# Patient Record
Sex: MALE | Race: BLACK OR AFRICAN AMERICAN | NOT HISPANIC OR LATINO | Employment: FULL TIME | URBAN - METROPOLITAN AREA
[De-identification: names, ages, dates, MRNs, and addresses within clinical notes are randomized per-mention and may not be internally consistent; named-entity substitution may affect disease eponyms.]

---

## 2017-04-10 ENCOUNTER — HOSPITAL ENCOUNTER (EMERGENCY)
Facility: HOSPITAL | Age: 72
Discharge: HOME | End: 2017-04-10
Attending: EMERGENCY MEDICINE
Payer: COMMERCIAL

## 2017-04-10 VITALS
OXYGEN SATURATION: 100 % | TEMPERATURE: 98 F | BODY MASS INDEX: 28.1 KG/M2 | RESPIRATION RATE: 18 BRPM | HEART RATE: 71 BPM | SYSTOLIC BLOOD PRESSURE: 167 MMHG | WEIGHT: 212 LBS | DIASTOLIC BLOOD PRESSURE: 75 MMHG | HEIGHT: 73 IN

## 2017-04-10 DIAGNOSIS — S61.219A FINGER LACERATION WITH COMPLICATION, INITIAL ENCOUNTER: ICD-10-CM

## 2017-04-10 DIAGNOSIS — S62.663A CLOSED NONDISPLACED FRACTURE OF DISTAL PHALANX OF LEFT MIDDLE FINGER, INITIAL ENCOUNTER: Primary | ICD-10-CM

## 2017-04-10 DIAGNOSIS — S61.309A TRAUMATIC AVULSION OF NAIL PLATE OF FINGER, INITIAL ENCOUNTER: ICD-10-CM

## 2017-04-10 DIAGNOSIS — S62.665A CLOSED NONDISPLACED FRACTURE OF DISTAL PHALANX OF LEFT RING FINGER, INITIAL ENCOUNTER: ICD-10-CM

## 2017-04-10 PROCEDURE — 90715 TDAP VACCINE 7 YRS/> IM: CPT | Performed by: EMERGENCY MEDICINE

## 2017-04-10 PROCEDURE — 90471 IMMUNIZATION ADMIN: CPT | Performed by: EMERGENCY MEDICINE

## 2017-04-10 PROCEDURE — 25000003 PHARM REV CODE 250: Performed by: EMERGENCY MEDICINE

## 2017-04-10 PROCEDURE — 99284 EMERGENCY DEPT VISIT MOD MDM: CPT | Mod: 25

## 2017-04-10 PROCEDURE — 29130 APPL FINGER SPLINT STATIC: CPT | Mod: LT

## 2017-04-10 PROCEDURE — 29131 APPL FINGER SPLINT DYNAMIC: CPT

## 2017-04-10 PROCEDURE — 63600175 PHARM REV CODE 636 W HCPCS: Performed by: EMERGENCY MEDICINE

## 2017-04-10 PROCEDURE — 12002 RPR S/N/AX/GEN/TRNK2.6-7.5CM: CPT

## 2017-04-10 PROCEDURE — 96372 THER/PROPH/DIAG INJ SC/IM: CPT

## 2017-04-10 RX ORDER — LIDOCAINE HYDROCHLORIDE 10 MG/ML
10 INJECTION, SOLUTION EPIDURAL; INFILTRATION; INTRACAUDAL; PERINEURAL
Status: COMPLETED | OUTPATIENT
Start: 2017-04-10 | End: 2017-04-10

## 2017-04-10 RX ORDER — SULFAMETHOXAZOLE AND TRIMETHOPRIM 800; 160 MG/1; MG/1
1 TABLET ORAL 2 TIMES DAILY
Qty: 10 TABLET | Refills: 0 | Status: SHIPPED | OUTPATIENT
Start: 2017-04-10 | End: 2017-04-15

## 2017-04-10 RX ORDER — MELOXICAM 15 MG/1
15 TABLET ORAL DAILY
Qty: 5 TABLET | Refills: 5 | OUTPATIENT
Start: 2017-04-10 | End: 2023-02-16

## 2017-04-10 RX ORDER — CEFAZOLIN SODIUM 1 G/3ML
1 INJECTION, POWDER, FOR SOLUTION INTRAMUSCULAR; INTRAVENOUS
Status: COMPLETED | OUTPATIENT
Start: 2017-04-10 | End: 2017-04-10

## 2017-04-10 RX ORDER — HYDROCODONE BITARTRATE AND ACETAMINOPHEN 10; 325 MG/1; MG/1
1 TABLET ORAL EVERY 4 HOURS PRN
Qty: 18 TABLET | Refills: 0 | Status: SHIPPED | OUTPATIENT
Start: 2017-04-10 | End: 2018-11-20

## 2017-04-10 RX ORDER — HYDROCODONE BITARTRATE AND ACETAMINOPHEN 10; 325 MG/1; MG/1
1 TABLET ORAL
Status: COMPLETED | OUTPATIENT
Start: 2017-04-10 | End: 2017-04-10

## 2017-04-10 RX ORDER — MELOXICAM 15 MG/1
7.5 TABLET ORAL DAILY
COMMUNITY
End: 2017-04-10

## 2017-04-10 RX ADMIN — CEFAZOLIN 1 G: 1 INJECTION, POWDER, FOR SOLUTION INTRAMUSCULAR; INTRAVENOUS at 11:04

## 2017-04-10 RX ADMIN — CLOSTRIDIUM TETANI TOXOID ANTIGEN (FORMALDEHYDE INACTIVATED), CORYNEBACTERIUM DIPHTHERIAE TOXOID ANTIGEN (FORMALDEHYDE INACTIVATED), BORDETELLA PERTUSSIS TOXOID ANTIGEN (GLUTARALDEHYDE INACTIVATED), BORDETELLA PERTUSSIS FILAMENTOUS HEMAGGLUTININ ANTIGEN (FORMALDEHYDE INACTIVATED), BORDETELLA PERTUSSIS PERTACTIN ANTIGEN, AND BORDETELLA PERTUSSIS FIMBRIAE 2/3 ANTIGEN 0.5 ML: 5; 2; 2.5; 5; 3; 5 INJECTION, SUSPENSION INTRAMUSCULAR at 11:04

## 2017-04-10 RX ADMIN — LIDOCAINE HYDROCHLORIDE 100 MG: 10 INJECTION, SOLUTION EPIDURAL; INFILTRATION; INTRACAUDAL; PERINEURAL at 12:04

## 2017-04-10 RX ADMIN — HYDROCODONE BITARTRATE AND ACETAMINOPHEN 1 TABLET: 10; 325 TABLET ORAL at 12:04

## 2017-04-10 NOTE — ED PROVIDER NOTES
Encounter Date: 4/10/2017       History     Chief Complaint   Patient presents with    Hand Injury     left middle finger smashed in roller of saw     Review of patient's allergies indicates:  No Known Allergies  Patient is a 72 y.o. male presenting with the following complaint: skin laceration. The history is provided by the patient.   Laceration    The incident occurred just prior to arrival. The laceration is located on the left hand. The laceration is 5 cm in size. The laceration mechanism was a a blunt object. The pain is at a severity of 9/10. The pain has been fluctuating since onset. He reports no foreign bodies present. His tetanus status is out of date.   Patient reports he was at work and was going to use a saw when he got it smashed in the roller, denies any active bleeding, numbness, weakness or paresthesias.   Past Medical History:   Diagnosis Date    Myasthenia gravis      Past Surgical History:   Procedure Laterality Date    ROTATOR CUFF REPAIR Left      History reviewed. No pertinent family history.  Social History   Substance Use Topics    Smoking status: Former Smoker    Smokeless tobacco: None    Alcohol use Yes      Comment: occasional beer     Review of Systems   Constitutional: Negative for chills and fever.   Musculoskeletal: Negative for myalgias.   Skin: Positive for wound. Negative for color change and pallor.   Neurological: Negative for weakness and numbness.   Hematological: Does not bruise/bleed easily.       Physical Exam   Initial Vitals   BP Pulse Resp Temp SpO2   04/10/17 1021 04/10/17 1021 04/10/17 1021 04/10/17 1021 04/10/17 1021   214/95 72 18 98 °F (36.7 °C) 99 %     Vitals:    04/10/17 1021 04/10/17 1255   BP: (!) 214/95 (!) 167/75  Comment: md aware of current vitals an ok with dc of pt.   BP Location: Right arm    Patient Position: Sitting    Pulse: 72 71   Resp: 18 18   Temp: 98 °F (36.7 °C)    TempSrc: Oral    SpO2: 99% 100%   Weight: 96.2 kg (212 lb)    Height: 6'  "1" (1.854 m)        Physical Exam      Nursing Notes and Vital Signs Reviewed.  Constitutional:  Well developed, well nourished.  Awake & alert.  He is in no apparent distress  Head:  Atraumatic.  Normocephalic.    Eyes:  PERRL.  EOMI.  Conjunctivae are not pale. No scleral icterus.  ENT:  Mucous membranes are moist and intact.  Oropharynx is clear and symmetric.    Neck:  Supple.  No JVD.  No lymphadenopathy.  Cardiovascular:  Regular rate.  Regular rhythm.  No murmurs, rubs, or gallops.  Distal pulses are 2+ and symmetric.  Pulmonary/Chest:  No evidence of respiratory distress.  Clear to auscultation bilaterally.  No wheezing, rales or rhonchi.  Abdominal:  Soft and non-distended.  There is no tenderness.  No rebound, guarding, or rigidity.  No organomegaly.  Good bowel sounds.      Musculoskeletal:  Small subungual hematoma involving the left 4th digit nail bed, nail and nail bed intact, positive tenderness, no deformity, mild swelling, no bleeding or laceration or abrasion.  There is a complex skin avulsion about 4 cm of the left 3rd distal finger (see photo below), the nail plate under the avulsed skin appears lacerated small amount of blood noted. NVI. Patient is able to flex and extend at all joint spaces.   Skin:  Skin is warm and dry.      Neurological:  Alert, awake, and appropriate.  Normal speech.  No acute focal neurological deficits are appreciated.  Psychiatric:  Good eye contact.  Appropriate in content/context. Normal affect.                ED Course   Splint Application  Date/Time: 4/10/2017 5:37 PM  Performed by: SADI GARZA  Authorized by: SADI GARZA   Consent Done: Not Needed  Location details: left ring finger  Splint type: dynamic finger  Supplies used: aluminum splint  Post-procedure: The splinted body part was neurovascularly unchanged following the procedure.  Patient tolerance: Patient tolerated the procedure well with no immediate complications    Lac " Repair  Date/Time: 4/10/2017 5:34 PM  Performed by: SADI GARZA  Authorized by: SADI GARZA   Consent Done: Not Needed  Body area: upper extremity (left 3rd finger)  Laceration length: 3 cm  Tendon involvement: none  Nerve involvement: none  Vascular damage: no  Anesthesia: digital block    Anesthesia:  Anesthesia: digital block  Local Anesthetic: lidocaine 1% without epinephrine   Patient sedated: no  Preparation: Patient was prepped and draped in the usual sterile fashion.  Irrigation solution: saline (betadine)  Irrigation method: jet lavage  Amount of cleaning: standard  Debridement: none  Degree of undermining: none  Skin closure: 4-0 nylon  Number of sutures: 5  Technique: simple  Approximation: loose  Approximation difficulty: simple  Dressing: Steri-Strips and splint  Patient tolerance: Patient tolerated the procedure well with no immediate complications  Comments: There also appears to be small less than 0.5 cm laceration of the nail plate involving the left 3rd finger.     Splint Application  Date/Time: 4/10/2017 5:51 PM  Performed by: SADI GARAZ  Authorized by: SADI GARZA   Consent Done: Not Needed  Location details: left long finger  Splint type: dynamic finger  Supplies used: aluminum splint  Post-procedure: The splinted body part was neurovascularly unchanged following the procedure.  Patient tolerance: Patient tolerated the procedure well with no immediate complications        Labs Reviewed - No data to display                 Imaging Results         X-Ray Finger 2 or More Views (Final result) Result time:  04/10/17 11:05:35    Final result by Celso Carreon MD (04/10/17 11:05:35)    Impression:     Acute comminuted fractures involving the distal phalanges of the middle and ring fingers.    Arthritic changes favoring osteoarthritis.         Electronically signed by: CESLO CARREON MD  Date:     04/10/17  Time:    11:05     Narrative:    Left index, middle  and ring finger x-rays 3 views    Clinical history: Acute left finger injury.     Comparison: None    Findings: There are acute comminuted fractures involving the distal phalanges of the middle and ring fingers.    There is joint space narrowing involving several of the interphalangeal joints of the fingers as well as the 1st metacarpophalangeal joint.                          ED Course     11:50 AM Consult Orthopedics:  spoke with Dr. Medrano discussed x-ray findings and physical exam findings would like sutures placed 4-0 nylon to approximate skin, steri-strips, aluminum splint, bactrim and pain medication and to follow up in his clinic on Wednesday.     1:00 PM Reassessed the pt.  The pt is resting comfortably and is NAD.  Discussed test results, shared treatment plan, specific conditions for return, and the need for f/u.  Answered their questions at this time.  Pt understands and agrees to the plan.  The pt has remained hemodynamically stable through ED course and is stable for discharge.     Pre-hypertension/Hypertension: The pt has been informed that they may have pre-hypertension or hypertension based on a blood pressure reading in the ED. I recommend that the pt call the PCP listed on their discharge instructions or a physician of their choice this week to arrange f/u for further evaluation of possible pre-hypertension or hypertension.       Clinical Impression:     1. Closed nondisplaced fracture of distal phalanx of left middle finger, initial encounter    2. Closed nondisplaced fracture of distal phalanx of left ring finger, initial encounter    3. Traumatic avulsion of nail plate of finger, initial encounter    4. Finger laceration with complication, initial encounter          Disposition:   Disposition: Discharged  Condition: Stable       Sudha Kingsley MD  04/10/17 5624

## 2017-04-10 NOTE — ED NOTES
Splint applied to 3r and 4th digit L hand with non adhering gauze under splint, wrapped with gauze and coban tape. Pt tolerated well.

## 2018-11-20 ENCOUNTER — HOSPITAL ENCOUNTER (EMERGENCY)
Facility: HOSPITAL | Age: 73
Discharge: HOME | End: 2018-11-20
Attending: EMERGENCY MEDICINE
Payer: COMMERCIAL

## 2018-11-20 VITALS
SYSTOLIC BLOOD PRESSURE: 172 MMHG | RESPIRATION RATE: 18 BRPM | BODY MASS INDEX: 28.53 KG/M2 | OXYGEN SATURATION: 98 % | DIASTOLIC BLOOD PRESSURE: 95 MMHG | HEART RATE: 68 BPM | TEMPERATURE: 98 F | WEIGHT: 216.25 LBS

## 2018-11-20 DIAGNOSIS — S51.811A FOREARM LACERATION, RIGHT, INITIAL ENCOUNTER: ICD-10-CM

## 2018-11-20 PROCEDURE — 25000003 PHARM REV CODE 250: Performed by: EMERGENCY MEDICINE

## 2018-11-20 PROCEDURE — 99283 EMERGENCY DEPT VISIT LOW MDM: CPT | Mod: 25

## 2018-11-20 PROCEDURE — 12002 RPR S/N/AX/GEN/TRNK2.6-7.5CM: CPT

## 2018-11-20 PROCEDURE — 12032 INTMD RPR S/A/T/EXT 2.6-7.5: CPT

## 2018-11-20 RX ORDER — LIDOCAINE HYDROCHLORIDE AND EPINEPHRINE 10; 10 MG/ML; UG/ML
10 INJECTION, SOLUTION INFILTRATION; PERINEURAL
Status: COMPLETED | OUTPATIENT
Start: 2018-11-20 | End: 2018-11-20

## 2018-11-20 RX ORDER — NAPROXEN 500 MG/1
500 TABLET ORAL 2 TIMES DAILY WITH MEALS
Qty: 20 TABLET | Refills: 0 | Status: SHIPPED | OUTPATIENT
Start: 2018-11-20 | End: 2018-11-30

## 2018-11-20 RX ORDER — PYRIDOSTIGMINE BROMIDE 60 MG/1
60 TABLET ORAL
COMMUNITY
End: 2021-07-12

## 2018-11-20 RX ORDER — PREDNISONE 20 MG/1
20 TABLET ORAL DAILY
COMMUNITY
End: 2021-07-12

## 2018-11-20 RX ADMIN — LIDOCAINE HYDROCHLORIDE,EPINEPHRINE BITARTRATE 10 ML: 10; .01 INJECTION, SOLUTION INFILTRATION; PERINEURAL at 08:11

## 2018-11-20 NOTE — ED PROVIDER NOTES
Encounter Date: 11/20/2018       History     Chief Complaint   Patient presents with    Laceration     right wrist lacerated on compresser blade     The history is provided by the patient.   Laceration    The incident occurred just prior to arrival. The laceration is located on the right arm. The laceration is 6 cm in size. The laceration mechanism was a a metal edge. Pain scale: mild. The pain has been constant since onset. He reports no foreign bodies present. His tetanus status is unknown.     Review of patient's allergies indicates:  No Known Allergies  Past Medical History:   Diagnosis Date    Arthritis     Myasthenia gravis      Past Surgical History:   Procedure Laterality Date    ROTATOR CUFF REPAIR Left      History reviewed. No pertinent family history.  Social History     Tobacco Use    Smoking status: Former Smoker    Smokeless tobacco: Never Used   Substance Use Topics    Alcohol use: Yes     Comment: occasional beer    Drug use: No     Review of Systems   Constitutional: Negative for fever.   HENT: Negative for sore throat.    Respiratory: Negative for shortness of breath.    Cardiovascular: Negative for chest pain.   Gastrointestinal: Negative for nausea.   Genitourinary: Negative for dysuria.   Musculoskeletal: Negative for back pain.   Skin: Negative for rash.   Neurological: Negative for weakness.   Hematological: Does not bruise/bleed easily.   All other systems reviewed and are negative.      Physical Exam     Initial Vitals [11/20/18 0747]   BP Pulse Resp Temp SpO2   (!) 217/103 75 18 98 °F (36.7 °C) 98 %      MAP       --         Physical Exam    Nursing note and vitals reviewed.  Constitutional: He appears well-developed and well-nourished. He is not diaphoretic. No distress.   HENT:   Head: Normocephalic and atraumatic.   Eyes: EOM are normal. Pupils are equal, round, and reactive to light. No scleral icterus.   Neck: Normal range of motion. Neck supple. No thyromegaly present.    Cardiovascular: Normal rate, regular rhythm, normal heart sounds and intact distal pulses. Exam reveals no gallop and no friction rub.    No murmur heard.  Pulmonary/Chest: Breath sounds normal. No respiratory distress. He has no wheezes. He has no rhonchi. He exhibits no tenderness.   Abdominal: Soft. Bowel sounds are normal. He exhibits no distension. There is no tenderness. There is no rebound and no guarding.   Musculoskeletal: Normal range of motion. He exhibits no edema or tenderness.        Right shoulder: Normal.        Left shoulder: Normal.        Right elbow: Normal.       Left elbow: Normal.        Right wrist: Normal.        Left wrist: Normal.        Cervical back: Normal.        Thoracic back: Normal.        Lumbar back: Normal.        Right upper arm: Normal.        Left upper arm: Normal.        Right forearm: He exhibits laceration (6 cm).        Left forearm: Normal.        Right hand: Normal. He exhibits normal capillary refill. Normal sensation noted. Normal strength noted.        Left hand: Normal. He exhibits normal capillary refill. Normal sensation noted. Normal strength noted.   Lymphadenopathy:     He has no cervical adenopathy.   Neurological: He is alert and oriented to person, place, and time. He has normal strength. No cranial nerve deficit or sensory deficit. GCS eye subscore is 4. GCS verbal subscore is 5. GCS motor subscore is 6.   Skin: Skin is warm and dry. Laceration noted.        Psychiatric: He has a normal mood and affect. His behavior is normal. Judgment and thought content normal.         ED Course   Lac Repair  Date/Time: 11/20/2018 8:05 AM  Performed by: Berto Wilson Jr., MD  Authorized by: Berto Wilson Jr., MD   Consent Done: Yes  Consent: Verbal consent obtained.  Risks and benefits: risks, benefits and alternatives were discussed  Consent given by: patient  Patient understanding: patient states understanding of the procedure being performed  Patient consent: the  patient's understanding of the procedure matches consent given  Procedure consent: procedure consent matches procedure scheduled  Relevant documents: relevant documents present and verified  Test results: test results not available  Site marked: the operative site was marked  Required items: required blood products, implants, devices, and special equipment available  Patient identity confirmed: , MRN and name  Body area: upper extremity  Location details: right lower arm  Laceration length: 6 cm  Contamination: The wound is contaminated.  Foreign bodies: no foreign bodies  Tendon involvement: none  Nerve involvement: none  Vascular damage: no  Anesthesia: local infiltration    Anesthesia:  Local Anesthetic: lidocaine 1% with epinephrine  Anesthetic total: 10 mL  Patient sedated: no  Preparation: Patient was prepped and draped in the usual sterile fashion.  Irrigation solution: saline  Irrigation method: syringe  Amount of cleaning: extensive  Debridement: none  Degree of undermining: none  Skin closure: 3-0 Prolene  Number of sutures: 6  Technique: simple  Approximation: close  Approximation difficulty: simple  Dressing: 4x4 sterile gauze and non-stick sterile dressing  Patient tolerance: Patient tolerated the procedure well with no immediate complications        Labs Reviewed - No data to display       Imaging Results          X-Ray Forearm Right (Final result)  Result time 18 08:59:16    Final result by Brandon Rogers MD (18 08:59:16)                 Impression:      No acute fracture or dislocation.      Electronically signed by: Brandon Rogers MD  Date:    2018  Time:    08:59             Narrative:    EXAMINATION:  XR FOREARM RIGHT    CLINICAL HISTORY:  XR FOREARM RIGHTLaceration without foreign body of right forearm, initial encounter    COMPARISON:  None    FINDINGS:  Two views of the left forearm were obtained.    No evidence of acute fracture or dislocation.  Bony mineralization is  normal.  Soft tissues are unremarkable.   No foreign body is identified.                                    Vitals:    11/20/18 0747 11/20/18 0902   BP: (!) 217/103 (!) 172/95   Pulse: 75 68   Resp: 18    Temp: 98 °F (36.7 °C)    TempSrc: Oral    SpO2: 98%    Weight: 98.1 kg (216 lb 4.3 oz)        Results for orders placed or performed during the hospital encounter of 04/02/15   CBC auto differential   Result Value Ref Range    WBC 4.48 3.90 - 12.70 K/uL    RBC 4.43 (L) 4.60 - 6.20 M/uL    Hemoglobin 13.3 (L) 14.0 - 18.0 g/dL    Hematocrit 40.8 40.0 - 54.0 %    MCV 92 82 - 98 fL    MCH 30.0 27.0 - 31.0 pg    MCHC 32.6 32.0 - 36.0 %    RDW 12.0 11.5 - 14.5 %    Platelets 226 150 - 350 K/uL    MPV 8.8 (L) 9.2 - 12.9 fL    Gran # (ANC) 1.9 1.8 - 7.7 K/uL    Lymph # 1.9 1.0 - 4.8 K/uL    Mono # 0.6 0.3 - 1.0 K/uL    Eos # 0.1 0.0 - 0.5 K/uL    Baso # 0.02 0.00 - 0.20 K/uL    Gran% 43.3 38.0 - 73.0 %    Lymph% 41.3 18.0 - 48.0 %    Mono% 13.2 4.0 - 15.0 %    Eosinophil% 1.8 0.0 - 8.0 %    Basophil% 0.4 0.0 - 1.9 %    Differential Method Automated    Comprehensive metabolic panel   Result Value Ref Range    Sodium 143 136 - 145 mmol/L    Potassium 4.2 3.5 - 5.1 mmol/L    Chloride 109 95 - 110 mmol/L    CO2 25 23 - 29 mmol/L    Glucose 103 70 - 110 mg/dL    BUN, Bld 17 8 - 23 mg/dL    Creatinine 1.0 0.5 - 1.4 mg/dL    Calcium 9.9 8.7 - 10.5 mg/dL    Total Protein 7.5 6.0 - 8.4 g/dL    Albumin 4.0 3.5 - 5.2 g/dL    Total Bilirubin 0.2 0.1 - 1.0 mg/dL    Alkaline Phosphatase 55 55 - 135 U/L    AST 24 10 - 40 U/L    ALT 34 10 - 44 U/L    Anion Gap 9 8 - 16 mmol/L    eGFR if African American >60.0 >60 mL/min/1.73 m^2    eGFR if non African American >60.0 >60 mL/min/1.73 m^2   Protime-INR   Result Value Ref Range    Prothrombin Time 9.8 9.0 - 12.5 sec    INR 1.0 0.8 - 1.2   Troponin I   Result Value Ref Range    Troponin I 0.014 0.000 - 0.026 ng/mL   B-Type natriuretic peptide (BNP)   Result Value Ref Range    BNP <10 0 - 99  pg/mL   APTT   Result Value Ref Range    aPTT 27.3 21.0 - 32.0 sec   Troponin I   Result Value Ref Range    Troponin I <0.006 0.000 - 0.026 ng/mL         Imaging Results          X-Ray Forearm Right (Final result)  Result time 11/20/18 08:59:16    Final result by Brandon Rogers MD (11/20/18 08:59:16)                 Impression:      No acute fracture or dislocation.      Electronically signed by: Brandon Rogers MD  Date:    11/20/2018  Time:    08:59             Narrative:    EXAMINATION:  XR FOREARM RIGHT    CLINICAL HISTORY:  XR FOREARM RIGHTLaceration without foreign body of right forearm, initial encounter    COMPARISON:  None    FINDINGS:  Two views of the left forearm were obtained.    No evidence of acute fracture or dislocation.  Bony mineralization is normal.  Soft tissues are unremarkable.   No foreign body is identified.                                Medications   lidocaine-EPINEPHrine 1%-1:100,000 injection 10 mL (10 mLs Other Given 11/20/18 0810)       8:41 AM - Re-evaluation: The patient is resting comfortably and is in no acute distress. He states that his symptoms have improved after treatment within ER. Discussed test results, shared treatment plan, specific conditions for return, and importance of follow up with patient and family.  He understands and agrees with the plan as discussed. Answered  his questions at this time. He has remained hemodynamically stable throughout the ED course and is appropriate for discharge home.     Regarding LACERATION CARE, patient was instructed to wash hands with soap and warm water before and after caring for wound; keep the wound dry for the first 24 to 48 hours and then gently clean the wound once or twice a day with cool water using soap to clean around the wound; avoid using alcohol or hydrogen peroxide to clean wound unless directed to; and use bandages to keep wound clean and protected and to prevent swelling.  Advised patient to contact primary healthcare  provider if wound splits open; becomes extremely painful; appears to not be healing; has a foreign object present; develop a purulent discharge; or note the skin around the wound becoming numb, edematous, or erythematous.  Patient instructed to follow up with primary care provider for wound re-check or closure removal in 5-7 days.    Pre-hypertension/Hypertension: The pt has been informed that they may have pre-hypertension or hypertension based on a blood pressure reading in the ED. I recommend that the pt call the PCP listed on their discharge instructions or a physician of their choice this week to arrange f/u for further evaluation of possible pre-hypertension or hypertension.     Kobi Covington was given a handout which discussed their disease process, precautions, and instructions for follow-up and therapy.    Follow-up Information     Forrest Dubois MD. Schedule an appointment as soon as possible for a visit in 1 week.    Specialty:  Family Medicine  Why:  For suture removal, For wound re-check  Contact information:  [id number] [address] [address]  [phone]             [facility].    Specialty:  Emergency Medicine  Why:  As needed, If symptoms worsen  Contact information:  [address] [address] [address] [phone]                    Medication List      START taking these medications    naproxen 500 MG EC tablet  Commonly known as:  EC NAPROSYN  Take 1 tablet (500 mg total) by mouth 2 (two) times daily with meals. for 10 days        ASK your doctor about these medications    meloxicam 15 MG tablet  Commonly known as:  MOBIC  Take 1 tablet (15 mg total) by mouth once daily.     predniSONE 20 MG tablet  Commonly known as:  DELTASONE     pyridostigmine 60 mg Tab  Commonly known as:  MESTINON           Where to Get Your Medications      You can get these medications from any pharmacy    Bring a paper prescription for each of  these medications  · naproxen 500 MG EC tablet        Current Discharge Medication List            ED Diagnosis  1. Forearm laceration, right, initial encounter                             Clinical Impression:   The encounter diagnosis was Forearm laceration, right, initial encounter.      Disposition:   Disposition: Discharged  Condition: Stable                        Berto Wilson Jr., MD  11/20/18 [id number]

## 2018-11-20 NOTE — DISCHARGE INSTRUCTIONS
Regarding LACERATION CARE, patient was instructed to wash hands with soap and warm water before and after caring for wound; keep the wound dry for the first 24 to 48 hours and then gently clean the wound once or twice a day with cool water using soap to clean around the wound; avoid using alcohol or hydrogen peroxide to clean wound unless directed to; and use bandages to keep wound clean and protected and to prevent swelling.  Advised patient to contact primary healthcare provider if wound splits open; becomes extremely painful; appears to not be healing; has a foreign object present; develop a purulent discharge; or note the skin around the wound becoming numb, edematous, or erythematous.  Patient instructed to follow up with primary care provider for wound re-check or closure removal in 5-7 days.

## 2018-11-20 NOTE — ED NOTES
Dr. Wilson aware of pt's vital signs & states OK for discharge home at this time. Pt is stable, in NAD, RR even & unlabored. Pt denies any further needs, questions, concerns or complaints. Will d/c per order.

## 2020-04-24 ENCOUNTER — HOSPITAL ENCOUNTER (EMERGENCY)
Facility: HOSPITAL | Age: 75
Discharge: HOME | End: 2020-04-24
Attending: EMERGENCY MEDICINE
Payer: COMMERCIAL

## 2020-04-24 VITALS
BODY MASS INDEX: 30.02 KG/M2 | OXYGEN SATURATION: 98 % | SYSTOLIC BLOOD PRESSURE: 138 MMHG | WEIGHT: 227.5 LBS | TEMPERATURE: 99 F | RESPIRATION RATE: 20 BRPM | DIASTOLIC BLOOD PRESSURE: 78 MMHG | HEART RATE: 66 BPM

## 2020-04-24 DIAGNOSIS — R60.0 PEDAL EDEMA: ICD-10-CM

## 2020-04-24 LAB
ALBUMIN SERPL BCP-MCNC: 3.6 G/DL (ref 3.5–5.2)
ALP SERPL-CCNC: 58 U/L (ref 55–135)
ALT SERPL W/O P-5'-P-CCNC: 10 U/L (ref 10–44)
ANION GAP SERPL CALC-SCNC: 7 MMOL/L (ref 8–16)
AST SERPL-CCNC: 16 U/L (ref 10–40)
BASOPHILS # BLD AUTO: 0.04 K/UL (ref 0–0.2)
BASOPHILS NFR BLD: 1.1 % (ref 0–1.9)
BILIRUB SERPL-MCNC: 0.3 MG/DL (ref 0.1–1)
BILIRUB UR QL STRIP: NEGATIVE
BNP SERPL-MCNC: 122 PG/ML (ref 0–99)
BUN SERPL-MCNC: 12 MG/DL (ref 8–23)
CALCIUM SERPL-MCNC: 9 MG/DL (ref 8.7–10.5)
CHLORIDE SERPL-SCNC: 108 MMOL/L (ref 95–110)
CLARITY UR REFRACT.AUTO: CLEAR
CO2 SERPL-SCNC: 24 MMOL/L (ref 23–29)
COLOR UR AUTO: COLORLESS
CREAT SERPL-MCNC: 1.1 MG/DL (ref 0.5–1.4)
DIFFERENTIAL METHOD: ABNORMAL
EOSINOPHIL # BLD AUTO: 0.1 K/UL (ref 0–0.5)
EOSINOPHIL NFR BLD: 3.6 % (ref 0–8)
ERYTHROCYTE [DISTWIDTH] IN BLOOD BY AUTOMATED COUNT: 11.2 % (ref 11.5–14.5)
EST. GFR  (AFRICAN AMERICAN): >60 ML/MIN/1.73 M^2
EST. GFR  (NON AFRICAN AMERICAN): >60 ML/MIN/1.73 M^2
GLUCOSE SERPL-MCNC: 98 MG/DL (ref 70–110)
GLUCOSE UR QL STRIP: NEGATIVE
HCT VFR BLD AUTO: 33.7 % (ref 40–54)
HGB BLD-MCNC: 10.9 G/DL (ref 14–18)
HGB UR QL STRIP: NEGATIVE
IMM GRANULOCYTES # BLD AUTO: 0.01 K/UL (ref 0–0.04)
IMM GRANULOCYTES NFR BLD AUTO: 0.3 % (ref 0–0.5)
KETONES UR QL STRIP: NEGATIVE
LEUKOCYTE ESTERASE UR QL STRIP: NEGATIVE
LYMPHOCYTES # BLD AUTO: 1.3 K/UL (ref 1–4.8)
LYMPHOCYTES NFR BLD: 36.1 % (ref 18–48)
MCH RBC QN AUTO: 30.4 PG (ref 27–31)
MCHC RBC AUTO-ENTMCNC: 32.3 G/DL (ref 32–36)
MCV RBC AUTO: 94 FL (ref 82–98)
MONOCYTES # BLD AUTO: 0.5 K/UL (ref 0.3–1)
MONOCYTES NFR BLD: 14.9 % (ref 4–15)
NEUTROPHILS # BLD AUTO: 1.6 K/UL (ref 1.8–7.7)
NEUTROPHILS NFR BLD: 44 % (ref 38–73)
NITRITE UR QL STRIP: NEGATIVE
NRBC BLD-RTO: 0 /100 WBC
PH UR STRIP: 7 [PH] (ref 5–8)
PLATELET # BLD AUTO: 196 K/UL (ref 150–350)
PMV BLD AUTO: 9.2 FL (ref 9.2–12.9)
POTASSIUM SERPL-SCNC: 3.9 MMOL/L (ref 3.5–5.1)
PROT SERPL-MCNC: 6.7 G/DL (ref 6–8.4)
PROT UR QL STRIP: NEGATIVE
RBC # BLD AUTO: 3.58 M/UL (ref 4.6–6.2)
SODIUM SERPL-SCNC: 139 MMOL/L (ref 136–145)
SP GR UR STRIP: 1.01 (ref 1–1.03)
TROPONIN I SERPL DL<=0.01 NG/ML-MCNC: 0.01 NG/ML (ref 0–0.03)
URN SPEC COLLECT METH UR: ABNORMAL
UROBILINOGEN UR STRIP-ACNC: NEGATIVE EU/DL
WBC # BLD AUTO: 3.63 K/UL (ref 3.9–12.7)

## 2020-04-24 PROCEDURE — 81003 URINALYSIS AUTO W/O SCOPE: CPT | Mod: ER

## 2020-04-24 PROCEDURE — 93010 EKG 12-LEAD: ICD-10-PCS | Mod: ,,, | Performed by: INTERNAL MEDICINE

## 2020-04-24 PROCEDURE — 85025 COMPLETE CBC W/AUTO DIFF WBC: CPT | Mod: ER

## 2020-04-24 PROCEDURE — 63600175 PHARM REV CODE 636 W HCPCS: Mod: ER | Performed by: EMERGENCY MEDICINE

## 2020-04-24 PROCEDURE — 93005 ELECTROCARDIOGRAM TRACING: CPT | Mod: ER

## 2020-04-24 PROCEDURE — 83880 ASSAY OF NATRIURETIC PEPTIDE: CPT | Mod: ER

## 2020-04-24 PROCEDURE — 96374 THER/PROPH/DIAG INJ IV PUSH: CPT | Mod: ER

## 2020-04-24 PROCEDURE — 80053 COMPREHEN METABOLIC PANEL: CPT | Mod: ER

## 2020-04-24 PROCEDURE — 99285 EMERGENCY DEPT VISIT HI MDM: CPT | Mod: 25,ER

## 2020-04-24 PROCEDURE — 93010 ELECTROCARDIOGRAM REPORT: CPT | Mod: ,,, | Performed by: INTERNAL MEDICINE

## 2020-04-24 PROCEDURE — 84484 ASSAY OF TROPONIN QUANT: CPT | Mod: ER

## 2020-04-24 RX ORDER — GABAPENTIN 300 MG/1
CAPSULE ORAL
COMMUNITY
End: 2021-07-12

## 2020-04-24 RX ORDER — FUROSEMIDE 20 MG/1
40 TABLET ORAL
COMMUNITY
End: 2021-07-12

## 2020-04-24 RX ORDER — MELOXICAM 15 MG/1
15 TABLET ORAL
COMMUNITY
End: 2021-07-12

## 2020-04-24 RX ORDER — FUROSEMIDE 10 MG/ML
40 INJECTION INTRAMUSCULAR; INTRAVENOUS
Status: COMPLETED | OUTPATIENT
Start: 2020-04-24 | End: 2020-04-24

## 2020-04-24 RX ADMIN — FUROSEMIDE 40 MG: 10 INJECTION, SOLUTION INTRAMUSCULAR; INTRAVENOUS at 08:04

## 2020-04-25 NOTE — ED NOTES
Pt recd to ED with complaints of pain and swelling noted to BLE; pt reports that swelling has been ongoing several weeks at this time. Pt reports that the pain and swelling noted to BLE making it difficult to walk at this time. Pt skin is taunt, shiny and swelling noted to knee area down to toes at this time; pt skin is warm upon touch to mid area of leg with toes cool upon touch at this time. Pt has several fluid filled vesicles noted to lower part of extremities related to leg swelling at this time. Pt reports mild pain upon touch while assessing area at this time with no other complaints. Pt denies hx of CHF, Diabetes or Hypertension at this time with this being the first time having swelling noted to BLE at this time. Pt is AAOx3 with VSS at this time.

## 2020-04-25 NOTE — ED PROVIDER NOTES
Encounter Date: 4/24/2020       History     Chief Complaint   Patient presents with    Leg Pain     bilateral leg pain + swelling. pt concerned about blood clot. denies sob cough or fever     Patient is a 75-year-old male presents today with complaints of bilateral lower extremity edema times 1-2 months.  States that he saw his primary care physician recently and was prescribed Lasix 40 mg daily.  He states he has been taking this for the past 2-3 weeks without improvement.  He presents today for persistence of bilateral lower extremity edema.  He denies known heart failure, chronic kidney disease, or liver disease.  Denies any shortness of breath, chest pain, palpitations, changes in urinary output, abdominal pain, fever/chills, skin erythema, and all other symptoms.  He reports compliance with his Lasix, however he admits to not taking it today.  Normally takes it in the mornings        Review of patient's allergies indicates:  No Known Allergies  Past Medical History:   Diagnosis Date    Arthritis     Myasthenia gravis      Past Surgical History:   Procedure Laterality Date    ROTATOR CUFF REPAIR Left      History reviewed. No pertinent family history.  Social History     Tobacco Use    Smoking status: Former Smoker    Smokeless tobacco: Never Used   Substance Use Topics    Alcohol use: Yes     Comment: occasional beer    Drug use: No     Review of Systems   Constitutional: Negative for chills, diaphoresis and fever.   HENT: Negative for congestion, rhinorrhea and sore throat.    Eyes: Negative for pain, redness and visual disturbance.   Respiratory: Negative for cough and shortness of breath.    Cardiovascular: Positive for leg swelling. Negative for chest pain and palpitations.   Gastrointestinal: Negative for abdominal distention, abdominal pain, blood in stool, constipation, diarrhea, nausea and vomiting.   Genitourinary: Negative for dysuria and hematuria.   Musculoskeletal: Negative for arthralgias  and joint swelling.   Skin: Negative for rash and wound.   Neurological: Negative for seizures, syncope and headaches.   All other systems reviewed and are negative.      Physical Exam     Initial Vitals [04/24/20 1926]   BP Pulse Resp Temp SpO2   (!) 140/82 71 18 98.7 °F (37.1 °C) 100 %      MAP       --         Physical Exam    Nursing note and vitals reviewed.  Constitutional: He appears well-developed and well-nourished. No distress.   HENT:   Head: Normocephalic and atraumatic.   Mouth/Throat: Oropharynx is clear and moist.   Eyes: Conjunctivae and EOM are normal. Pupils are equal, round, and reactive to light.   Neck: Neck supple. No tracheal deviation present.   Cardiovascular: Normal rate, regular rhythm, normal heart sounds and intact distal pulses.   Pulmonary/Chest: Breath sounds normal. No respiratory distress.   Abdominal: Soft. He exhibits no distension. There is no tenderness. There is no rebound and no guarding.   Musculoskeletal: Normal range of motion. He exhibits edema (bilateral lower extremity). He exhibits no tenderness.   No unilateral leg width discrepancy   Neurological: He is alert and oriented to person, place, and time.   No focal deficits   Skin: Skin is warm. No rash noted. No erythema.   Psychiatric: He has a normal mood and affect. His behavior is normal.         ED Course   Procedures  Labs Reviewed   CBC W/ AUTO DIFFERENTIAL - Abnormal; Notable for the following components:       Result Value    WBC 3.63 (*)     RBC 3.58 (*)     Hemoglobin 10.9 (*)     Hematocrit 33.7 (*)     RDW 11.2 (*)     Gran # (ANC) 1.6 (*)     All other components within normal limits   COMPREHENSIVE METABOLIC PANEL - Abnormal; Notable for the following components:    Anion Gap 7 (*)     All other components within normal limits   B-TYPE NATRIURETIC PEPTIDE - Abnormal; Notable for the following components:     (*)     All other components within normal limits   URINALYSIS - Abnormal; Notable for the  following components:    Color, UA Colorless (*)     All other components within normal limits   TROPONIN I     Results for orders placed or performed during the hospital encounter of 04/24/20   CBC auto differential   Result Value Ref Range    WBC 3.63 (L) 3.90 - 12.70 K/uL    RBC 3.58 (L) 4.60 - 6.20 M/uL    Hemoglobin 10.9 (L) 14.0 - 18.0 g/dL    Hematocrit 33.7 (L) 40.0 - 54.0 %    Mean Corpuscular Volume 94 82 - 98 fL    Mean Corpuscular Hemoglobin 30.4 27.0 - 31.0 pg    Mean Corpuscular Hemoglobin Conc 32.3 32.0 - 36.0 g/dL    RDW 11.2 (L) 11.5 - 14.5 %    Platelets 196 150 - 350 K/uL    MPV 9.2 9.2 - 12.9 fL    Immature Granulocytes 0.3 0.0 - 0.5 %    Gran # (ANC) 1.6 (L) 1.8 - 7.7 K/uL    Immature Grans (Abs) 0.01 0.00 - 0.04 K/uL    Lymph # 1.3 1.0 - 4.8 K/uL    Mono # 0.5 0.3 - 1.0 K/uL    Eos # 0.1 0.0 - 0.5 K/uL    Baso # 0.04 0.00 - 0.20 K/uL    nRBC 0 0 /100 WBC    Gran% 44.0 38.0 - 73.0 %    Lymph% 36.1 18.0 - 48.0 %    Mono% 14.9 4.0 - 15.0 %    Eosinophil% 3.6 0.0 - 8.0 %    Basophil% 1.1 0.0 - 1.9 %    Differential Method Automated    Comprehensive metabolic panel   Result Value Ref Range    Sodium 139 136 - 145 mmol/L    Potassium 3.9 3.5 - 5.1 mmol/L    Chloride 108 95 - 110 mmol/L    CO2 24 23 - 29 mmol/L    Glucose 98 70 - 110 mg/dL    BUN, Bld 12 8 - 23 mg/dL    Creatinine 1.1 0.5 - 1.4 mg/dL    Calcium 9.0 8.7 - 10.5 mg/dL    Total Protein 6.7 6.0 - 8.4 g/dL    Albumin 3.6 3.5 - 5.2 g/dL    Total Bilirubin 0.3 0.1 - 1.0 mg/dL    Alkaline Phosphatase 58 55 - 135 U/L    AST 16 10 - 40 U/L    ALT 10 10 - 44 U/L    Anion Gap 7 (L) 8 - 16 mmol/L    eGFR if African American >60.0 >60 mL/min/1.73 m^2    eGFR if non African American >60.0 >60 mL/min/1.73 m^2   Brain natriuretic peptide   Result Value Ref Range     (H) 0 - 99 pg/mL   Urinalysis   Result Value Ref Range    Specimen UA Urine, Clean Catch     Color, UA Colorless (A) Yellow, Straw, Berta    Appearance, UA Clear Clear    pH, UA 7.0  5.0 - 8.0    Specific Gravity, UA 1.015 1.005 - 1.030    Protein, UA Negative Negative    Glucose, UA Negative Negative    Ketones, UA Negative Negative    Bilirubin (UA) Negative Negative    Occult Blood UA Negative Negative    Nitrite, UA Negative Negative    Urobilinogen, UA Negative <2.0 EU/dL    Leukocytes, UA Negative Negative   Troponin I   Result Value Ref Range    Troponin I 0.008 0.000 - 0.026 ng/mL            Imaging Results          X-Ray Chest AP Portable (Final result)  Result time 04/24/20 20:38:48    Final result by Deshaun Llanos MD (04/24/20 20:38:48)                 Impression:      No acute findings.  No evidence of CHF.      Electronically signed by: Deshaun Llanos MD  Date:    04/24/2020  Time:    20:38             Narrative:    EXAMINATION:  XR CHEST AP PORTABLE    CLINICAL HISTORY:  Respiratory distress., Pedal edema;    COMPARISON:  04/02/2015.    FINDINGS:  Heart size is normal.  The aortic arch is calcified.    The lung fields are clear at this time.                              EKG reviewed interpreted by ED provider as normal sinus rhythm with a rate of 61, normal axis, normal intervals, normal ST-T segments    Medications   furosemide injection 40 mg (40 mg Intravenous Given 4/24/20 2013)          Medical Decision Making:   Bilateral lower extremity edema; ED workup reveals no emergent etiology; patient skipped his Lasix dose today; 1 IV dose of Lasix given good urinary output; patient advised to increase his Lasix from 40 mg daily to 40 mg twice a day over the next 2 days this weekend, and then caused primary care physician on Monday for further instructions; patient discharged home stable condition                                 Clinical Impression:   Final diagnoses:  [R60.0] Pedal edema          ED Disposition Condition    Discharge Stable        ED Prescriptions     None        Follow-up Information     Follow up With Specialties Details Why Contact Info    Forrest ESCOBAR  MD [facility] Schedule an appointment as soon as possible for a visit   [address] [facility], [place] [address]  [phone]      [facility] Emergency Medicine  As needed, If symptoms worsen [address]  [facility] [id number] [phone]                                     Brandon Lancaster MD  04/24/20 1642

## 2020-04-25 NOTE — DISCHARGE INSTRUCTIONS
Increase lasix from 40 mg once a day to 40 mg twice a day for the next 2 days. Then call your primary care physician for further instructions

## 2021-07-12 ENCOUNTER — HOSPITAL ENCOUNTER (EMERGENCY)
Facility: HOSPITAL | Age: 76
Discharge: HOME | End: 2021-07-12
Attending: EMERGENCY MEDICINE
Payer: COMMERCIAL

## 2021-07-12 VITALS
OXYGEN SATURATION: 100 % | BODY MASS INDEX: 28.2 KG/M2 | RESPIRATION RATE: 18 BRPM | TEMPERATURE: 99 F | DIASTOLIC BLOOD PRESSURE: 94 MMHG | SYSTOLIC BLOOD PRESSURE: 168 MMHG | HEIGHT: 73 IN | WEIGHT: 212.75 LBS | HEART RATE: 60 BPM

## 2021-07-12 DIAGNOSIS — G56.22 ULNAR NEUROPATHY OF LEFT UPPER EXTREMITY: Primary | ICD-10-CM

## 2021-07-12 DIAGNOSIS — M79.602 LEFT ARM PAIN: ICD-10-CM

## 2021-07-12 LAB
ALBUMIN SERPL BCP-MCNC: 3.6 G/DL (ref 3.5–5.2)
ALP SERPL-CCNC: 41 U/L (ref 55–135)
ALT SERPL W/O P-5'-P-CCNC: 17 U/L (ref 10–44)
ANION GAP SERPL CALC-SCNC: 11 MMOL/L (ref 8–16)
AST SERPL-CCNC: 18 U/L (ref 10–40)
BASOPHILS # BLD AUTO: 0.02 K/UL (ref 0–0.2)
BASOPHILS NFR BLD: 0.3 % (ref 0–1.9)
BILIRUB SERPL-MCNC: 0.3 MG/DL (ref 0.1–1)
BNP SERPL-MCNC: 57 PG/ML (ref 0–99)
BUN SERPL-MCNC: 16 MG/DL (ref 8–23)
CALCIUM SERPL-MCNC: 8.7 MG/DL (ref 8.7–10.5)
CHLORIDE SERPL-SCNC: 109 MMOL/L (ref 95–110)
CO2 SERPL-SCNC: 22 MMOL/L (ref 23–29)
CREAT SERPL-MCNC: 1 MG/DL (ref 0.5–1.4)
DIFFERENTIAL METHOD: ABNORMAL
EOSINOPHIL # BLD AUTO: 0 K/UL (ref 0–0.5)
EOSINOPHIL NFR BLD: 0.3 % (ref 0–8)
ERYTHROCYTE [DISTWIDTH] IN BLOOD BY AUTOMATED COUNT: 11.6 % (ref 11.5–14.5)
EST. GFR  (AFRICAN AMERICAN): >60 ML/MIN/1.73 M^2
EST. GFR  (NON AFRICAN AMERICAN): >60 ML/MIN/1.73 M^2
GLUCOSE SERPL-MCNC: 117 MG/DL (ref 70–110)
HCT VFR BLD AUTO: 35.5 % (ref 40–54)
HGB BLD-MCNC: 11.7 G/DL (ref 14–18)
IMM GRANULOCYTES # BLD AUTO: 0.02 K/UL (ref 0–0.04)
IMM GRANULOCYTES NFR BLD AUTO: 0.3 % (ref 0–0.5)
LYMPHOCYTES # BLD AUTO: 1.5 K/UL (ref 1–4.8)
LYMPHOCYTES NFR BLD: 22.5 % (ref 18–48)
MCH RBC QN AUTO: 30.3 PG (ref 27–31)
MCHC RBC AUTO-ENTMCNC: 33 G/DL (ref 32–36)
MCV RBC AUTO: 92 FL (ref 82–98)
MONOCYTES # BLD AUTO: 0.6 K/UL (ref 0.3–1)
MONOCYTES NFR BLD: 8.7 % (ref 4–15)
NEUTROPHILS # BLD AUTO: 4.5 K/UL (ref 1.8–7.7)
NEUTROPHILS NFR BLD: 67.9 % (ref 38–73)
NRBC BLD-RTO: 0 /100 WBC
PLATELET # BLD AUTO: 200 K/UL (ref 150–450)
PMV BLD AUTO: 9.1 FL (ref 9.2–12.9)
POTASSIUM SERPL-SCNC: 4 MMOL/L (ref 3.5–5.1)
PROT SERPL-MCNC: 6.6 G/DL (ref 6–8.4)
RBC # BLD AUTO: 3.86 M/UL (ref 4.6–6.2)
SODIUM SERPL-SCNC: 142 MMOL/L (ref 136–145)
TROPONIN I SERPL DL<=0.01 NG/ML-MCNC: 0.01 NG/ML (ref 0–0.03)
WBC # BLD AUTO: 6.57 K/UL (ref 3.9–12.7)

## 2021-07-12 PROCEDURE — 96360 HYDRATION IV INFUSION INIT: CPT | Mod: ER

## 2021-07-12 PROCEDURE — 85025 COMPLETE CBC W/AUTO DIFF WBC: CPT | Mod: ER | Performed by: EMERGENCY MEDICINE

## 2021-07-12 PROCEDURE — 80053 COMPREHEN METABOLIC PANEL: CPT | Mod: ER | Performed by: EMERGENCY MEDICINE

## 2021-07-12 PROCEDURE — 93010 EKG 12-LEAD: ICD-10-PCS | Mod: ,,, | Performed by: INTERNAL MEDICINE

## 2021-07-12 PROCEDURE — 99285 EMERGENCY DEPT VISIT HI MDM: CPT | Mod: 25,ER

## 2021-07-12 PROCEDURE — 83880 ASSAY OF NATRIURETIC PEPTIDE: CPT | Mod: ER | Performed by: EMERGENCY MEDICINE

## 2021-07-12 PROCEDURE — 84484 ASSAY OF TROPONIN QUANT: CPT | Mod: ER | Performed by: EMERGENCY MEDICINE

## 2021-07-12 PROCEDURE — 25000003 PHARM REV CODE 250: Mod: ER | Performed by: EMERGENCY MEDICINE

## 2021-07-12 PROCEDURE — 93005 ELECTROCARDIOGRAM TRACING: CPT | Mod: ER

## 2021-07-12 PROCEDURE — 93010 ELECTROCARDIOGRAM REPORT: CPT | Mod: ,,, | Performed by: INTERNAL MEDICINE

## 2021-07-12 RX ORDER — GABAPENTIN 300 MG/1
300 CAPSULE ORAL 3 TIMES DAILY
Qty: 42 CAPSULE | Refills: 0 | Status: SHIPPED | OUTPATIENT
Start: 2021-07-12 | End: 2024-01-22 | Stop reason: SDUPTHER

## 2021-07-12 RX ORDER — ASPIRIN 325 MG
325 TABLET ORAL
Status: COMPLETED | OUTPATIENT
Start: 2021-07-12 | End: 2021-07-12

## 2021-07-12 RX ORDER — DICLOFENAC SODIUM 50 MG/1
50 TABLET, DELAYED RELEASE ORAL 2 TIMES DAILY
Qty: 14 TABLET | Refills: 0 | Status: SHIPPED | OUTPATIENT
Start: 2021-07-12 | End: 2021-07-19

## 2021-07-12 RX ORDER — PREDNISONE 10 MG/1
10 TABLET ORAL DAILY
COMMUNITY

## 2021-07-12 RX ADMIN — SODIUM CHLORIDE 500 ML: 0.9 INJECTION, SOLUTION INTRAVENOUS at 06:07

## 2021-07-12 RX ADMIN — ASPIRIN 325 MG: 325 TABLET ORAL at 06:07

## 2022-01-08 ENCOUNTER — HOSPITAL ENCOUNTER (EMERGENCY)
Facility: HOSPITAL | Age: 77
Discharge: LEFT BEFORE DISCHARGE | End: 2022-01-08
Attending: EMERGENCY MEDICINE
Payer: COMMERCIAL

## 2022-01-08 VITALS
BODY MASS INDEX: 30.59 KG/M2 | DIASTOLIC BLOOD PRESSURE: 91 MMHG | TEMPERATURE: 98 F | HEART RATE: 61 BPM | RESPIRATION RATE: 20 BRPM | HEIGHT: 73 IN | WEIGHT: 230.81 LBS | SYSTOLIC BLOOD PRESSURE: 164 MMHG | OXYGEN SATURATION: 100 %

## 2022-01-08 DIAGNOSIS — R79.89 ELEVATED D-DIMER: Primary | ICD-10-CM

## 2022-01-08 DIAGNOSIS — M79.89 LEG SWELLING: ICD-10-CM

## 2022-01-08 LAB
ALBUMIN SERPL BCP-MCNC: 3.6 G/DL (ref 3.5–5.2)
ALP SERPL-CCNC: 48 U/L (ref 55–135)
ALT SERPL W/O P-5'-P-CCNC: 19 U/L (ref 10–44)
ANION GAP SERPL CALC-SCNC: 9 MMOL/L (ref 8–16)
AST SERPL-CCNC: 20 U/L (ref 10–40)
BASOPHILS # BLD AUTO: 0.03 K/UL (ref 0–0.2)
BASOPHILS NFR BLD: 1 % (ref 0–1.9)
BILIRUB SERPL-MCNC: 0.5 MG/DL (ref 0.1–1)
BILIRUB UR QL STRIP: NEGATIVE
BNP SERPL-MCNC: 62 PG/ML (ref 0–99)
BUN SERPL-MCNC: 11 MG/DL (ref 8–23)
CALCIUM SERPL-MCNC: 8.8 MG/DL (ref 8.7–10.5)
CHLORIDE SERPL-SCNC: 108 MMOL/L (ref 95–110)
CLARITY UR REFRACT.AUTO: CLEAR
CO2 SERPL-SCNC: 23 MMOL/L (ref 23–29)
COLOR UR AUTO: YELLOW
CREAT SERPL-MCNC: 0.9 MG/DL (ref 0.5–1.4)
CTP QC/QA: YES
D DIMER PPP IA.FEU-MCNC: 0.84 MG/L FEU
DIFFERENTIAL METHOD: ABNORMAL
EOSINOPHIL # BLD AUTO: 0.1 K/UL (ref 0–0.5)
EOSINOPHIL NFR BLD: 3.5 % (ref 0–8)
ERYTHROCYTE [DISTWIDTH] IN BLOOD BY AUTOMATED COUNT: 11.7 % (ref 11.5–14.5)
EST. GFR  (AFRICAN AMERICAN): >60 ML/MIN/1.73 M^2
EST. GFR  (NON AFRICAN AMERICAN): >60 ML/MIN/1.73 M^2
GLUCOSE SERPL-MCNC: 102 MG/DL (ref 70–110)
GLUCOSE UR QL STRIP: NEGATIVE
HCT VFR BLD AUTO: 36.6 % (ref 40–54)
HCV AB SERPL QL IA: NEGATIVE
HGB BLD-MCNC: 12.1 G/DL (ref 14–18)
HGB UR QL STRIP: NEGATIVE
IMM GRANULOCYTES # BLD AUTO: 0.01 K/UL (ref 0–0.04)
IMM GRANULOCYTES NFR BLD AUTO: 0.3 % (ref 0–0.5)
KETONES UR QL STRIP: NEGATIVE
LEUKOCYTE ESTERASE UR QL STRIP: NEGATIVE
LYMPHOCYTES # BLD AUTO: 1 K/UL (ref 1–4.8)
LYMPHOCYTES NFR BLD: 31.4 % (ref 18–48)
MCH RBC QN AUTO: 30.5 PG (ref 27–31)
MCHC RBC AUTO-ENTMCNC: 33.1 G/DL (ref 32–36)
MCV RBC AUTO: 92 FL (ref 82–98)
MONOCYTES # BLD AUTO: 0.4 K/UL (ref 0.3–1)
MONOCYTES NFR BLD: 12.8 % (ref 4–15)
NEUTROPHILS # BLD AUTO: 1.6 K/UL (ref 1.8–7.7)
NEUTROPHILS NFR BLD: 51 % (ref 38–73)
NITRITE UR QL STRIP: NEGATIVE
NRBC BLD-RTO: 0 /100 WBC
PH UR STRIP: 7 [PH] (ref 5–8)
PLATELET # BLD AUTO: 184 K/UL (ref 150–450)
PMV BLD AUTO: 9.1 FL (ref 9.2–12.9)
POTASSIUM SERPL-SCNC: 3.7 MMOL/L (ref 3.5–5.1)
PROT SERPL-MCNC: 6.4 G/DL (ref 6–8.4)
PROT UR QL STRIP: NEGATIVE
RBC # BLD AUTO: 3.97 M/UL (ref 4.6–6.2)
SARS-COV-2 RDRP RESP QL NAA+PROBE: NEGATIVE
SODIUM SERPL-SCNC: 140 MMOL/L (ref 136–145)
SP GR UR STRIP: 1.01 (ref 1–1.03)
TROPONIN I SERPL DL<=0.01 NG/ML-MCNC: <0.006 NG/ML (ref 0–0.03)
URN SPEC COLLECT METH UR: NORMAL
UROBILINOGEN UR STRIP-ACNC: <2 EU/DL
WBC # BLD AUTO: 3.12 K/UL (ref 3.9–12.7)

## 2022-01-08 PROCEDURE — 83880 ASSAY OF NATRIURETIC PEPTIDE: CPT | Mod: ER | Performed by: EMERGENCY MEDICINE

## 2022-01-08 PROCEDURE — 86803 HEPATITIS C AB TEST: CPT | Performed by: EMERGENCY MEDICINE

## 2022-01-08 PROCEDURE — 93010 ELECTROCARDIOGRAM REPORT: CPT | Mod: ,,, | Performed by: INTERNAL MEDICINE

## 2022-01-08 PROCEDURE — 84484 ASSAY OF TROPONIN QUANT: CPT | Mod: ER | Performed by: EMERGENCY MEDICINE

## 2022-01-08 PROCEDURE — 93010 EKG 12-LEAD: ICD-10-PCS | Mod: ,,, | Performed by: INTERNAL MEDICINE

## 2022-01-08 PROCEDURE — 96372 THER/PROPH/DIAG INJ SC/IM: CPT | Mod: ER

## 2022-01-08 PROCEDURE — 80053 COMPREHEN METABOLIC PANEL: CPT | Mod: ER | Performed by: EMERGENCY MEDICINE

## 2022-01-08 PROCEDURE — 85379 FIBRIN DEGRADATION QUANT: CPT | Mod: ER | Performed by: EMERGENCY MEDICINE

## 2022-01-08 PROCEDURE — U0002 COVID-19 LAB TEST NON-CDC: HCPCS | Mod: ER | Performed by: EMERGENCY MEDICINE

## 2022-01-08 PROCEDURE — 81003 URINALYSIS AUTO W/O SCOPE: CPT | Mod: ER | Performed by: EMERGENCY MEDICINE

## 2022-01-08 PROCEDURE — 93005 ELECTROCARDIOGRAM TRACING: CPT | Mod: ER

## 2022-01-08 PROCEDURE — 99285 EMERGENCY DEPT VISIT HI MDM: CPT | Mod: 25,ER

## 2022-01-08 PROCEDURE — 63600175 PHARM REV CODE 636 W HCPCS: Mod: ER | Performed by: EMERGENCY MEDICINE

## 2022-01-08 PROCEDURE — 85025 COMPLETE CBC W/AUTO DIFF WBC: CPT | Mod: ER | Performed by: EMERGENCY MEDICINE

## 2022-01-08 RX ORDER — ENOXAPARIN SODIUM 100 MG/ML
1.5 INJECTION SUBCUTANEOUS
Status: COMPLETED | OUTPATIENT
Start: 2022-01-08 | End: 2022-01-08

## 2022-01-08 RX ADMIN — ENOXAPARIN SODIUM 160 MG: 100 INJECTION, SOLUTION INTRAVENOUS; SUBCUTANEOUS at 09:01

## 2022-01-08 NOTE — ED PROVIDER NOTES
Encounter Date: 1/8/2022       History     Chief Complaint   Patient presents with    bodyaches     Bilat arm and hand tingling and swelling for a long time, bliat leg swelling..     76-year-old male with past medical history of myasthenia gravis here with complaints of leg swelling.  This is localized to the bilateral legs.  This is moderate in severity.  This is been worsening over the past 2-3 months.  Patient is unsure of why this is happening.  Patient also reporting intermittent paresthesias in the bilateral hands that is mild in severity.  He denies any numbness, weakness, chest pain, shortness of breath, abdominal pain, testicular swelling.        Review of patient's allergies indicates:  No Known Allergies  Past Medical History:   Diagnosis Date    Arthritis     Myasthenia gravis      Past Surgical History:   Procedure Laterality Date    ROTATOR CUFF REPAIR Left      History reviewed. No pertinent family history.  Social History     Tobacco Use    Smoking status: Former Smoker    Smokeless tobacco: Never Used   Substance Use Topics    Alcohol use: Yes     Comment: occasional beer    Drug use: No     Review of Systems   Constitutional: Negative for chills and fever.   HENT: Negative for congestion and dental problem.    Eyes: Negative for blurred vision, pain and visual disturbance.   Respiratory: Negative for cough and shortness of breath.    Cardiovascular: Positive for leg swelling. Negative for chest pain and palpitations.   Gastrointestinal: Negative for abdominal pain, diarrhea, nausea and vomiting.   Genitourinary: Negative for dysuria and flank pain.   Musculoskeletal: Negative for back pain and neck pain.   Skin: Negative for rash and wound.   Neurological: Negative for weakness, numbness and headaches.        Bilateral paresthesias.   Psychiatric/Behavioral: Negative for memory loss.       Physical Exam     Initial Vitals [01/08/22 0832]   BP Pulse Resp Temp SpO2   (!) 151/86 76 19 97.9 °F  (36.6 °C) 99 %      MAP       --         Physical Exam    Constitutional: He appears well-developed and well-nourished. No distress.   HENT:   Head: Normocephalic and atraumatic.   Eyes: EOM are normal. Pupils are equal, round, and reactive to light.   Neck: Neck supple.   Normal range of motion.  Cardiovascular: Normal rate and regular rhythm.   Pulmonary/Chest: Breath sounds normal. No respiratory distress.   Abdominal: He exhibits no distension. There is no abdominal tenderness.   Musculoskeletal:         General: No tenderness. Normal range of motion.      Cervical back: Normal range of motion and neck supple.      Comments: 4+ bilateral pitting edema in the pretibial regions.     Neurological: He is alert and oriented to person, place, and time. He has normal strength. No sensory deficit.   Skin: Skin is warm and dry.   Psychiatric: He has a normal mood and affect.         ED Course   Procedures  Labs Reviewed   CBC W/ AUTO DIFFERENTIAL - Abnormal; Notable for the following components:       Result Value    WBC 3.12 (*)     RBC 3.97 (*)     Hemoglobin 12.1 (*)     Hematocrit 36.6 (*)     MPV 9.1 (*)     Gran # (ANC) 1.6 (*)     All other components within normal limits    Narrative:     Release to patient->Immediate   COMPREHENSIVE METABOLIC PANEL - Abnormal; Notable for the following components:    Alkaline Phosphatase 48 (*)     All other components within normal limits    Narrative:     Release to patient->Immediate   D DIMER, QUANTITATIVE - Abnormal; Notable for the following components:    D-Dimer 0.84 (*)     All other components within normal limits    Narrative:     Release to patient->Immediate   B-TYPE NATRIURETIC PEPTIDE    Narrative:     Release to patient->Immediate   TROPONIN I    Narrative:     Release to patient->Immediate   HEPATITIS C ANTIBODY   HEP C VIRUS HOLD SPECIMEN   URINALYSIS, REFLEX TO URINE CULTURE   SARS-COV-2 RDRP GENE     EKG Readings: (Independently Interpreted)   Defer rate of  61 beats per minute.  Sinus rhythm with premature atrial complexes.  Normal axis.  P.r., QRS and QTC intervals within normal limits.  No STEMI.       Imaging Results          X-Ray Chest AP Portable (Final result)  Result time 01/08/22 09:13:45    Final result by ELOY Womack Sr., MD (01/08/22 09:13:45)                 Impression:      Normal study.      Electronically signed by: Cm Womack MD  Date:    01/08/2022  Time:    09:13             Narrative:    EXAMINATION:  XR CHEST AP PORTABLE    CLINICAL HISTORY:  Swelling;    COMPARISON:  07/12/2021    FINDINGS:  The size of the heart is normal. The lungs are clear. There is no pneumothorax.  The costophrenic angles are sharp.                                 Medications   enoxaparin injection 160 mg (160 mg Subcutaneous Given 1/8/22 0953)                 ED Course as of 01/08/22 0957   Sat Jan 08, 2022   0949 I discussed the patient's positive D-dimer with him, and that he needs an US to r/o DVT in the legs given his swelling. Our facility does not offer US in the [facility] location on the weekends, and he will have to be transported by POV or Ambulance to [place] facility of his choice for US. He was made aware of the risk of death, disability, deterioration if this is not done, and he has a blood clot as there is a risk of progression to PE. He has no chest pain, SOB, or PERSAUD at this time, and PE is unlikely, but given his swelling, DVT is likely and per [facility] criteria he needs an US. He does not want to go to [place] today, and understands this risk. He says he will go another day. He requested to leave against my advice, and he if of capacity to do so. I discussed that if he had a blood clot, he would need blood thinner's to treat this and give him the best chance to prevent complication. He understands there is a risk of bleeding with blood thinner's and this risk would not be neccessary if he did not have a blood clot. I suggested that if he  were going to leave, that he consider taking a shot of Lovenox here, which he agreed was a good idea and accepted. He will sign out AMA, and I have instructed him to get an US within the next 24 hours. He understands and agree's with this plan.  [BA]      ED Course User Index  [BA] Mayo Matute MD           The patient is over the age of 18 years, exhibits no evidence of an altered level of consciousness, and possesses appropriate decision-making capacity based on their ability to understand and communicate rationally.  Patient was advised, that, for an optimal recovery, they should be responsible for complying with the individualized treatment plan provided today by the medical professionals at [facility].  This includes taking medications as directed, reviewing and understanding all discharge instructions, and scheduling any appointments that were recommended.  I informed the patient that failing to take responsibility for their health and safety and not complying with the recommendations provided to today is deemed to be against our medical advice. The patient was provided clear and verbal details regarding alternatives, benefits, risks, and complications related to their condition. In addition, we reiterated the seriousness of their condition and our recommendations for urgent follow-up care with a qualified healthcare provider capable of addressing their specific needs.  Furthermore, the patient was made aware of the serious complications that may be associated with their condition, including: stroke, permanent disability, paralysis, loss of limb(s), and death.  I have personally explained to the patient that choosing to leave against medical advise may result in permanent bodily harm or death. I again discussed at great length that without further evaluation and monitoring there may be unforeseen circumstances and/or deterioration causing permanent bodily harm or death as a result of his/her choice. The patient  verbalized these risks back to me in laymans terms.  The patient was able to discuss the treatment that was recommended and, was aware of specific risks associated with the refusal of care relating to their specific condition.  Patient was instructed to return to the emergency department if their condition changes or they wish to comply with our treatment recommendations.       Clinical Impression:   Final diagnoses:  [M79.89] Leg swelling  [R79.89] Elevated d-dimer (Primary)          ED Disposition Condition    AMA       Katharina Matute MD  01/08/22 0957

## 2022-01-10 ENCOUNTER — HOSPITAL ENCOUNTER (EMERGENCY)
Facility: HOSPITAL | Age: 77
Discharge: HOME | End: 2022-01-10
Attending: EMERGENCY MEDICINE
Payer: COMMERCIAL

## 2022-01-10 VITALS
RESPIRATION RATE: 16 BRPM | WEIGHT: 225.19 LBS | DIASTOLIC BLOOD PRESSURE: 78 MMHG | TEMPERATURE: 98 F | SYSTOLIC BLOOD PRESSURE: 162 MMHG | OXYGEN SATURATION: 98 % | BODY MASS INDEX: 29.71 KG/M2 | HEART RATE: 86 BPM

## 2022-01-10 DIAGNOSIS — R60.9 EDEMA, UNSPECIFIED TYPE: ICD-10-CM

## 2022-01-10 DIAGNOSIS — M79.89 LEG SWELLING: ICD-10-CM

## 2022-01-10 DIAGNOSIS — I10 HYPERTENSION, UNSPECIFIED TYPE: ICD-10-CM

## 2022-01-10 DIAGNOSIS — I87.2 VENOUS INSUFFICIENCY OF BOTH LOWER EXTREMITIES: Primary | ICD-10-CM

## 2022-01-10 PROCEDURE — 99284 EMERGENCY DEPT VISIT MOD MDM: CPT | Mod: 25,ER

## 2022-01-10 RX ORDER — FUROSEMIDE 20 MG/1
20 TABLET ORAL DAILY
Qty: 30 TABLET | Refills: 0 | Status: SHIPPED | OUTPATIENT
Start: 2022-01-10 | End: 2024-02-06

## 2022-01-10 NOTE — ED PROVIDER NOTES
Encounter Date: 1/10/2022       History     Chief Complaint   Patient presents with    Leg Swelling     Here for test for leg swelling. Leg swelling has not worsened.      HPI   Pt presents for US imaging of lower extremities. Pt was seen this weekend and had an elevated d-dimer. Pt reports chronic swelling of LE Luis and has tried support stockings but reports slipping below knee. Pt denies use of diuretic. Pt denies CP/SOB or change in sx since this weekend.    Review of patient's allergies indicates:  No Known Allergies  Past Medical History:   Diagnosis Date    Arthritis     Myasthenia gravis      Past Surgical History:   Procedure Laterality Date    ROTATOR CUFF REPAIR Left      No family history on file.  Social History     Tobacco Use    Smoking status: Former Smoker    Smokeless tobacco: Never Used   Substance Use Topics    Alcohol use: Yes     Comment: occasional beer    Drug use: No     Review of Systems   Cardiovascular: Positive for leg swelling.   All other systems reviewed and are negative.      Physical Exam     Initial Vitals [01/10/22 1129]   BP Pulse Resp Temp SpO2   (!) 162/78 86 16 98.3 °F (36.8 °C) 98 %      MAP       --         Physical Exam    Nursing note and vitals reviewed.  Constitutional: He appears well-developed and well-nourished.   HENT:   Head: Normocephalic and atraumatic.   Mouth/Throat: Oropharynx is clear and moist.   Eyes: Conjunctivae and EOM are normal. Pupils are equal, round, and reactive to light.   Neck: Neck supple.   Normal range of motion.  Cardiovascular: Normal rate, regular rhythm and normal heart sounds.   Pulmonary/Chest: Breath sounds normal.   Abdominal: Abdomen is soft. Bowel sounds are normal.   Musculoskeletal:         General: Edema present. Normal range of motion.      Cervical back: Normal range of motion and neck supple.     Neurological: He is alert and oriented to person, place, and time. He has normal strength.   Skin: Skin is warm and dry.    Psychiatric: He has a normal mood and affect. Thought content normal.         ED Course   Procedures  Labs Reviewed - No data to display       Imaging Results          US Lower Extremity Veins Bilateral (Final result)  Result time 01/10/22 12:41:49    Final result by Brandon Rogers MD (01/10/22 12:41:49)                 Impression:      No evidence of deep venous thrombosis in either lower extremity.      Electronically signed by: Brandon Rogers MD  Date:    01/10/2022  Time:    12:41             Narrative:    EXAMINATION:  US LOWER EXTREMITY VEINS BILATERAL    CLINICAL HISTORY:  Other specified soft tissue disorders    TECHNIQUE:  Duplex and color flow Doppler and dynamic compression was performed of the bilateral lower extremity veins was performed.    COMPARISON:  None    FINDINGS:  Right thigh veins: The common femoral, femoral, popliteal, upper greater saphenous, and deep femoral veins are patent and free of thrombus. The veins are normally compressible and have normal phasic flow and augmentation response.    Right calf veins: The visualized calf veins are patent.    Left thigh veins: The common femoral, femoral, popliteal, upper greater saphenous, and deep femoral veins are patent and free of thrombus. The veins are normally compressible and have normal phasic flow and augmentation response.    Left calf veins: The visualized calf veins are patent.    Miscellaneous: None                            1:14 PM - Counseling: Spoke with the patient and discussed todays findings, in addition to providing specific details for the plan of care and counseling regarding the diagnosis and prognosis. Questions are answered at this time.       Medications - No data to display                       Clinical Impression:   Final diagnoses:  [M79.89] Leg swelling  [I10] Hypertension, unspecified type  [I87.2] Venous insufficiency of both lower extremities (Primary)  [R60.9] Edema, unspecified type          ED Disposition  Condition    Discharge Stable        ED Prescriptions     Medication Sig Dispense Start Date End Date Auth. Provider    furosemide (LASIX) 20 MG tablet Take 1 tablet (20 mg total) by mouth once daily. 30 tablet 1/10/2022 1/10/2023 Vin Franco MD        Follow-up Information     Follow up With Specialties Details Why Contact Info    Forrest Dubois MD Family Medicine Call in 2 days  [address] [facility] [address]  [phone]      [facility] - Emergency Dept Emergency Medicine  If symptoms worsen [address]  [facility] [id number] [phone]           Vin Franco MD  01/10/22 7532

## 2022-02-04 ENCOUNTER — HOSPITAL ENCOUNTER (EMERGENCY)
Facility: HOSPITAL | Age: 77
Discharge: HOME | End: 2022-02-04
Attending: EMERGENCY MEDICINE
Payer: COMMERCIAL

## 2022-02-04 VITALS
RESPIRATION RATE: 16 BRPM | HEIGHT: 73 IN | BODY MASS INDEX: 30.01 KG/M2 | WEIGHT: 226.44 LBS | OXYGEN SATURATION: 100 % | DIASTOLIC BLOOD PRESSURE: 79 MMHG | TEMPERATURE: 98 F | SYSTOLIC BLOOD PRESSURE: 164 MMHG | HEART RATE: 75 BPM

## 2022-02-04 DIAGNOSIS — I10 HYPERTENSION: ICD-10-CM

## 2022-02-04 DIAGNOSIS — M79.602 LEFT ARM PAIN: ICD-10-CM

## 2022-02-04 DIAGNOSIS — R07.89 ATYPICAL CHEST PAIN: ICD-10-CM

## 2022-02-04 DIAGNOSIS — I16.0 HYPERTENSIVE URGENCY: Primary | ICD-10-CM

## 2022-02-04 LAB
ALBUMIN SERPL BCP-MCNC: 3.8 G/DL (ref 3.5–5.2)
ALP SERPL-CCNC: 52 U/L (ref 55–135)
ALT SERPL W/O P-5'-P-CCNC: 17 U/L (ref 10–44)
ANION GAP SERPL CALC-SCNC: 13 MMOL/L (ref 8–16)
AST SERPL-CCNC: 19 U/L (ref 10–40)
BASOPHILS # BLD AUTO: 0.04 K/UL (ref 0–0.2)
BASOPHILS NFR BLD: 0.8 % (ref 0–1.9)
BILIRUB SERPL-MCNC: 0.4 MG/DL (ref 0.1–1)
BNP SERPL-MCNC: 73 PG/ML (ref 0–99)
BUN SERPL-MCNC: 12 MG/DL (ref 8–23)
CALCIUM SERPL-MCNC: 9.2 MG/DL (ref 8.7–10.5)
CHLORIDE SERPL-SCNC: 106 MMOL/L (ref 95–110)
CO2 SERPL-SCNC: 21 MMOL/L (ref 23–29)
CREAT SERPL-MCNC: 0.9 MG/DL (ref 0.5–1.4)
DIFFERENTIAL METHOD: ABNORMAL
EOSINOPHIL # BLD AUTO: 0.1 K/UL (ref 0–0.5)
EOSINOPHIL NFR BLD: 2.7 % (ref 0–8)
ERYTHROCYTE [DISTWIDTH] IN BLOOD BY AUTOMATED COUNT: 11.8 % (ref 11.5–14.5)
EST. GFR  (AFRICAN AMERICAN): >60 ML/MIN/1.73 M^2
EST. GFR  (NON AFRICAN AMERICAN): >60 ML/MIN/1.73 M^2
GLUCOSE SERPL-MCNC: 110 MG/DL (ref 70–110)
HCT VFR BLD AUTO: 38.5 % (ref 40–54)
HGB BLD-MCNC: 12.6 G/DL (ref 14–18)
IMM GRANULOCYTES # BLD AUTO: 0.01 K/UL (ref 0–0.04)
IMM GRANULOCYTES NFR BLD AUTO: 0.2 % (ref 0–0.5)
LYMPHOCYTES # BLD AUTO: 1.6 K/UL (ref 1–4.8)
LYMPHOCYTES NFR BLD: 34.2 % (ref 18–48)
MCH RBC QN AUTO: 30 PG (ref 27–31)
MCHC RBC AUTO-ENTMCNC: 32.7 G/DL (ref 32–36)
MCV RBC AUTO: 92 FL (ref 82–98)
MONOCYTES # BLD AUTO: 0.5 K/UL (ref 0.3–1)
MONOCYTES NFR BLD: 10.3 % (ref 4–15)
NEUTROPHILS # BLD AUTO: 2.5 K/UL (ref 1.8–7.7)
NEUTROPHILS NFR BLD: 51.8 % (ref 38–73)
NRBC BLD-RTO: 0 /100 WBC
PLATELET # BLD AUTO: 215 K/UL (ref 150–450)
PMV BLD AUTO: 9.2 FL (ref 9.2–12.9)
POTASSIUM SERPL-SCNC: 3.6 MMOL/L (ref 3.5–5.1)
PROT SERPL-MCNC: 6.9 G/DL (ref 6–8.4)
RBC # BLD AUTO: 4.2 M/UL (ref 4.6–6.2)
SODIUM SERPL-SCNC: 140 MMOL/L (ref 136–145)
TROPONIN I SERPL DL<=0.01 NG/ML-MCNC: 0.01 NG/ML (ref 0–0.03)
WBC # BLD AUTO: 4.74 K/UL (ref 3.9–12.7)

## 2022-02-04 PROCEDURE — 83880 ASSAY OF NATRIURETIC PEPTIDE: CPT | Mod: ER | Performed by: EMERGENCY MEDICINE

## 2022-02-04 PROCEDURE — 85025 COMPLETE CBC W/AUTO DIFF WBC: CPT | Mod: ER | Performed by: EMERGENCY MEDICINE

## 2022-02-04 PROCEDURE — 96374 THER/PROPH/DIAG INJ IV PUSH: CPT | Mod: ER

## 2022-02-04 PROCEDURE — 93010 EKG 12-LEAD: ICD-10-PCS | Mod: ,,, | Performed by: INTERNAL MEDICINE

## 2022-02-04 PROCEDURE — 93005 ELECTROCARDIOGRAM TRACING: CPT | Mod: ER

## 2022-02-04 PROCEDURE — 84484 ASSAY OF TROPONIN QUANT: CPT | Mod: ER | Performed by: EMERGENCY MEDICINE

## 2022-02-04 PROCEDURE — 63600175 PHARM REV CODE 636 W HCPCS: Mod: ER | Performed by: EMERGENCY MEDICINE

## 2022-02-04 PROCEDURE — 93010 ELECTROCARDIOGRAM REPORT: CPT | Mod: ,,, | Performed by: INTERNAL MEDICINE

## 2022-02-04 PROCEDURE — 99291 CRITICAL CARE FIRST HOUR: CPT | Mod: 25,ER

## 2022-02-04 PROCEDURE — 25000003 PHARM REV CODE 250: Mod: ER | Performed by: EMERGENCY MEDICINE

## 2022-02-04 PROCEDURE — 80053 COMPREHEN METABOLIC PANEL: CPT | Mod: ER | Performed by: EMERGENCY MEDICINE

## 2022-02-04 RX ORDER — HYDRALAZINE HYDROCHLORIDE 20 MG/ML
10 INJECTION INTRAMUSCULAR; INTRAVENOUS
Status: COMPLETED | OUTPATIENT
Start: 2022-02-04 | End: 2022-02-04

## 2022-02-04 RX ORDER — ASPIRIN 325 MG
325 TABLET ORAL
Status: COMPLETED | OUTPATIENT
Start: 2022-02-04 | End: 2022-02-04

## 2022-02-04 RX ORDER — HYDROCHLOROTHIAZIDE 12.5 MG/1
12.5 TABLET ORAL DAILY
Qty: 30 TABLET | Refills: 0 | Status: SHIPPED | OUTPATIENT
Start: 2022-02-04 | End: 2024-02-06

## 2022-02-04 RX ORDER — ACETAMINOPHEN 500 MG
1000 TABLET ORAL EVERY 6 HOURS PRN
Qty: 60 TABLET | Refills: 0 | Status: SHIPPED | OUTPATIENT
Start: 2022-02-04 | End: 2022-03-06

## 2022-02-04 RX ADMIN — ASPIRIN 325 MG ORAL TABLET 325 MG: 325 PILL ORAL at 06:02

## 2022-02-04 RX ADMIN — NITROGLYCERIN 1 INCH: 20 OINTMENT TOPICAL at 06:02

## 2022-02-04 RX ADMIN — HYDRALAZINE HYDROCHLORIDE 10 MG: 20 INJECTION INTRAMUSCULAR; INTRAVENOUS at 07:02

## 2022-02-05 NOTE — ED PROVIDER NOTES
"Encounter Date: 2/4/2022       History     Chief Complaint   Patient presents with    Arm Pain     Pt present to ED with concerns of a aching sensation to L arm, onset 1 month ago, worsen, pt reports having trouble sleeping last night, denies injury, denies chest pain and or SOB      Kobi Covington is a 77 y.o. male never-smoker who presents with gradual onset, moderate, nonradiating, "toothache-like" aching left upper arm and forearm pain with associated SOB x24 hrs, and intermittently over the past month.  No associated CP, nausea, or other complaints.  No famhx of CAD. Pt denies ever being dx'd with HTN, HLD, or DM.          Review of patient's allergies indicates:  No Known Allergies  Past Medical History:   Diagnosis Date    Arthritis     Myasthenia gravis      Past Surgical History:   Procedure Laterality Date    ROTATOR CUFF REPAIR Left      History reviewed. No pertinent family history.  Social History     Tobacco Use    Smoking status: Former Smoker    Smokeless tobacco: Never Used   Substance Use Topics    Alcohol use: Yes     Comment: occasional beer    Drug use: No     Review of Systems   Constitutional: Negative.  Negative for diaphoresis and fever.   HENT: Negative.    Eyes: Negative.    Respiratory: Positive for shortness of breath. Negative for cough.    Cardiovascular: Positive for leg swelling (chronic). Negative for chest pain.   Gastrointestinal: Negative for abdominal pain, nausea and vomiting.   Endocrine: Negative.    Genitourinary: Negative.    Musculoskeletal: Negative.    Skin: Negative.    Allergic/Immunologic: Negative.    Neurological: Negative.    Hematological: Negative.    Psychiatric/Behavioral: Negative.    All other systems reviewed and are negative.      Physical Exam     Initial Vitals [02/04/22 1825]   BP Pulse Resp Temp SpO2   (!) 224/108 74 20 98.2 °F (36.8 °C) 99 %      MAP       --         Physical Exam    Nursing note and vitals reviewed.  Constitutional: He appears " well-developed and well-nourished. No distress.   HENT:   Head: Normocephalic and atraumatic.   Eyes: Conjunctivae and EOM are normal. Pupils are equal, round, and reactive to light.   Neck: Neck supple.   Cardiovascular: Normal rate, regular rhythm, normal heart sounds and intact distal pulses.   Pulmonary/Chest: Breath sounds normal. No respiratory distress.   Abdominal: Abdomen is soft. He exhibits no distension. There is no abdominal tenderness.   Musculoskeletal:         General: No edema. Normal range of motion.      Cervical back: Neck supple.     Neurological: He is alert and oriented to person, place, and time. He has normal strength.   Skin: Skin is warm and dry. Capillary refill takes less than 2 seconds.   Psychiatric: He has a normal mood and affect. His behavior is normal. Judgment and thought content normal.         ED Course   Critical Care    Date/Time: 2/4/2022 6:49 PM  Performed by: Brady Carr MD  Authorized by: Brady Carr MD   Direct patient critical care time: 15 minutes  Additional history critical care time: 7 minutes  Ordering / reviewing critical care time: 7 minutes  Documentation critical care time: 7 minutes  Total critical care time (exclusive of procedural time) : 36 minutes  Critical care time was exclusive of separately billable procedures and treating other patients and teaching time.  Critical care was necessary to treat or prevent imminent or life-threatening deterioration of the following conditions: hypertensive emergency / severe uncontrolled HTN with chest pain equivalent.  Critical care was time spent personally by me on the following activities: blood draw for specimens, development of treatment plan with patient or surrogate, interpretation of cardiac output measurements, evaluation of patient's response to treatment, examination of patient, obtaining history from patient or surrogate, ordering and performing treatments and interventions, ordering and  review of laboratory studies, ordering and review of radiographic studies, pulse oximetry, re-evaluation of patient's condition and review of old charts.        Labs Reviewed   CBC W/ AUTO DIFFERENTIAL - Abnormal; Notable for the following components:       Result Value    RBC 4.20 (*)     Hemoglobin 12.6 (*)     Hematocrit 38.5 (*)     All other components within normal limits   COMPREHENSIVE METABOLIC PANEL - Abnormal; Notable for the following components:    CO2 21 (*)     Alkaline Phosphatase 52 (*)     All other components within normal limits   TROPONIN I   B-TYPE NATRIURETIC PEPTIDE     EKG Readings: (Independently Interpreted)   EKG Interpretation by Emergency Physician: Brady Carr MD  Rhythm: normal sinus  Rate: 67 bpm  No ST-T changes concerning for acute ischemia  Normal axis.   Normal intervals.           Imaging Results          X-Ray Chest AP Portable (Final result)  Result time 02/04/22 19:15:50    Final result by Magno Sellers MD (02/04/22 19:15:50)                 Impression:      No acute abnormality.      Electronically signed by: Marlo Kiran  Date:    02/04/2022  Time:    19:15             Narrative:    EXAMINATION:  XR CHEST AP PORTABLE    CLINICAL HISTORY:  Other chest pain    TECHNIQUE:  Single frontal view of the chest was performed.    COMPARISON:  None    FINDINGS:  The lungs are clear, with normal appearance of pulmonary vasculature and no pleural effusion or pneumothorax.    The cardiac silhouette is normal in size. The hilar and mediastinal contours are unremarkable.    Bones are intact.                                 Medications   nitroGLYCERIN 2% TD oint ointment 1 inch (1 inch Topical (Top) Given 2/4/22 1850)   aspirin tablet 325 mg (325 mg Oral Given 2/4/22 1849)   hydrALAZINE injection 10 mg (10 mg Intravenous Given 2/4/22 1939)                     Recent Results (from the past 24 hour(s))   CBC auto differential    Collection Time: 02/04/22  6:48 PM   Result Value Ref  Range    WBC 4.74 3.90 - 12.70 K/uL    RBC 4.20 (L) 4.60 - 6.20 M/uL    Hemoglobin 12.6 (L) 14.0 - 18.0 g/dL    Hematocrit 38.5 (L) 40.0 - 54.0 %    MCV 92 82 - 98 fL    MCH 30.0 27.0 - 31.0 pg    MCHC 32.7 32.0 - 36.0 g/dL    RDW 11.8 11.5 - 14.5 %    Platelets 215 150 - 450 K/uL    MPV 9.2 9.2 - 12.9 fL    Immature Granulocytes 0.2 0.0 - 0.5 %    Gran # (ANC) 2.5 1.8 - 7.7 K/uL    Immature Grans (Abs) 0.01 0.00 - 0.04 K/uL    Lymph # 1.6 1.0 - 4.8 K/uL    Mono # 0.5 0.3 - 1.0 K/uL    Eos # 0.1 0.0 - 0.5 K/uL    Baso # 0.04 0.00 - 0.20 K/uL    nRBC 0 0 /100 WBC    Gran % 51.8 38.0 - 73.0 %    Lymph % 34.2 18.0 - 48.0 %    Mono % 10.3 4.0 - 15.0 %    Eosinophil % 2.7 0.0 - 8.0 %    Basophil % 0.8 0.0 - 1.9 %    Differential Method Automated    Comprehensive metabolic panel    Collection Time: 02/04/22  6:48 PM   Result Value Ref Range    Sodium 140 136 - 145 mmol/L    Potassium 3.6 3.5 - 5.1 mmol/L    Chloride 106 95 - 110 mmol/L    CO2 21 (L) 23 - 29 mmol/L    Glucose 110 70 - 110 mg/dL    BUN 12 8 - 23 mg/dL    Creatinine 0.9 0.5 - 1.4 mg/dL    Calcium 9.2 8.7 - 10.5 mg/dL    Total Protein 6.9 6.0 - 8.4 g/dL    Albumin 3.8 3.5 - 5.2 g/dL    Total Bilirubin 0.4 0.1 - 1.0 mg/dL    Alkaline Phosphatase 52 (L) 55 - 135 U/L    AST 19 10 - 40 U/L    ALT 17 10 - 44 U/L    Anion Gap 13 8 - 16 mmol/L    eGFR if African American >60.0 >60 mL/min/1.73 m^2    eGFR if non African American >60.0 >60 mL/min/1.73 m^2   Troponin I    Collection Time: 02/04/22  6:48 PM   Result Value Ref Range    Troponin I 0.009 0.000 - 0.026 ng/mL   Brain natriuretic peptide    Collection Time: 02/04/22  6:48 PM   Result Value Ref Range    BNP 73 0 - 99 pg/mL       Medications   nitroGLYCERIN 2% TD oint ointment 1 inch (1 inch Topical (Top) Given 2/4/22 1850)   aspirin tablet 325 mg (325 mg Oral Given 2/4/22 1849)   hydrALAZINE injection 10 mg (10 mg Intravenous Given 2/4/22 1939)     Patient's evaluation in the ED does not suggest any emergent  or life threatening medical conditions requiring immediate intervention beyond what was provided in the ED, and I believe patient is safe for discharge.  Regardless, an unremarkable evaluation in the ED does not preclude the development or presence of a serious or life threatening condition. As such, patient was given return instructions for any change or worsening in symptoms.         Clinical Impression:   Final diagnoses:  [I10] Hypertension  [R07.89] Atypical chest pain  [M79.602] Left arm pain  [I16.0] Hypertensive urgency (Primary)          ED Disposition Condition    Discharge Stable        ED Prescriptions     Medication Sig Dispense Start Date End Date Auth. Provider    acetaminophen (TYLENOL) 500 MG tablet Take 2 tablets (1,000 mg total) by mouth every 6 (six) hours as needed for Pain. Do not exceed 6 tablets within 24 hours. 60 tablet 2/4/2022 3/6/2022 Brady Carr MD    hydroCHLOROthiazide (HYDRODIURIL) 12.5 MG Tab Take 1 tablet (12.5 mg total) by mouth once daily. 30 tablet 2/4/2022 3/6/2022 Brady Carr MD        Follow-up Information     Follow up With Specialties Details Why Contact Info    Pepe Stout MD Interventional Cardiology, Cardiology Schedule an appointment as soon as possible for a visit in 3 days for chest pain equivalent (aching arm pain with shortness of breath) [id number]  [place] [address]  [phone]      Forrest Dubois MD Family Medicine Schedule an appointment as soon as possible for a visit in 1 week  [address] [facility] [address]  [phone]      [facility] - Emergency Dept Emergency Medicine  As needed, If symptoms worsen [address]  [facility] [id number] [phone]           Brady Carr MD  02/04/22 2023

## 2022-02-05 NOTE — ED TRIAGE NOTES
Left arm pain times 1 month that has gotten worse this week. Also has high BP. 10/10 pain. Denies chest pain.

## 2022-10-20 ENCOUNTER — HOSPITAL ENCOUNTER (EMERGENCY)
Facility: HOSPITAL | Age: 77
Discharge: HOME | End: 2022-10-20
Attending: EMERGENCY MEDICINE
Payer: COMMERCIAL

## 2022-10-20 VITALS
DIASTOLIC BLOOD PRESSURE: 88 MMHG | WEIGHT: 214.94 LBS | TEMPERATURE: 99 F | RESPIRATION RATE: 18 BRPM | OXYGEN SATURATION: 97 % | SYSTOLIC BLOOD PRESSURE: 182 MMHG | HEART RATE: 82 BPM | HEIGHT: 73 IN | BODY MASS INDEX: 28.49 KG/M2

## 2022-10-20 DIAGNOSIS — T22.211A PARTIAL THICKNESS BURN OF RIGHT FOREARM, INITIAL ENCOUNTER: Primary | ICD-10-CM

## 2022-10-20 PROCEDURE — 99284 EMERGENCY DEPT VISIT MOD MDM: CPT | Mod: ER

## 2022-10-20 RX ORDER — BACITRACIN ZINC 500 UNIT/G
OINTMENT (GRAM) TOPICAL 2 TIMES DAILY
Qty: 30 G | Refills: 0 | Status: SHIPPED | OUTPATIENT
Start: 2022-10-20 | End: 2024-01-22

## 2022-10-20 RX ORDER — CEPHALEXIN 500 MG/1
500 CAPSULE ORAL 4 TIMES DAILY
Qty: 20 CAPSULE | Refills: 0 | Status: SHIPPED | OUTPATIENT
Start: 2022-10-20 | End: 2022-10-25

## 2022-10-20 NOTE — ED PROVIDER NOTES
Encounter Date: 10/20/2022       History     Chief Complaint   Patient presents with    Burn     Last week ago to right forearm.     Patient complains of right forearm burn from a week ago where he was working on a vehicle and put his forearm on a hot motor item.    The history is provided by the patient.   Burn  The patient complains of a thermal burn. The incident occurred several days ago. The incident occurred at home. The injury was related to a motor vehicle. The wounds were not self-inflicted. He came to the ER via by private vehicle. It is unlikely that a foreign body is present. There is no possibility that he inhaled smoke. Pertinent negatives include no chest pain, no altered mental status, no numbness, no abdominal pain, no nausea, no focal weakness and no weakness. There have been no prior injuries to these areas. His tetanus status is UTD. There were sick contacts none. He has received no recent medical care.   Review of patient's allergies indicates:  No Known Allergies  Past Medical History:   Diagnosis Date    Arthritis     Myasthenia gravis      Past Surgical History:   Procedure Laterality Date    ROTATOR CUFF REPAIR Left      No family history on file.  Social History     Tobacco Use    Smoking status: Former    Smokeless tobacco: Never   Substance Use Topics    Alcohol use: Yes     Comment: occasional beer    Drug use: No     Review of Systems   Constitutional:  Negative for fever.   HENT:  Negative for sore throat.    Respiratory:  Negative for shortness of breath.    Cardiovascular:  Negative for chest pain.   Gastrointestinal:  Negative for abdominal pain and nausea.   Genitourinary:  Negative for dysuria.   Musculoskeletal:  Negative for back pain.   Skin:  Negative for rash.   Neurological:  Negative for focal weakness, weakness and numbness.   Hematological:  Does not bruise/bleed easily.     Physical Exam     Initial Vitals [10/20/22 1511]   BP Pulse Resp Temp SpO2   (!) 182/88 82 18 98.7  °F (37.1 °C) 97 %      MAP       --         Physical Exam    Nursing note and vitals reviewed.  Constitutional: He appears well-developed and well-nourished.   HENT:   Head: Normocephalic and atraumatic.   Eyes: Conjunctivae are normal. Pupils are equal, round, and reactive to light.   Neck: Neck supple.   Normal range of motion.  Cardiovascular:  Normal rate, regular rhythm, normal heart sounds and intact distal pulses.           Pulmonary/Chest: Breath sounds normal.   Abdominal: Abdomen is soft. There is no rebound and no guarding.   Musculoskeletal:         General: Normal range of motion.      Cervical back: Normal range of motion and neck supple.     Neurological: He is alert.   Skin: Skin is warm and dry.   Right forearm wound about the size of a quarter.  Partial-thickness With surrounding erythema no streaking redness no swelling.   Psychiatric: He has a normal mood and affect. His behavior is normal. Thought content normal.       ED Course   Procedures  Labs Reviewed - No data to display       Imaging Results    None          Medications - No data to display  Medical Decision Making:   Patient is in no distress I did suggest that the patient have images taken and referral made to the [facility] burn unit for this for follow-up however the patient did not want to do this and essentially told me that he would prefer antibiotics and he will follow up on his home.  I did put in an ambulatory referral to [facility] to help finding a new primary care doctor.                        Clinical Impression:   Final diagnoses:  [T22.211A] Partial thickness burn of right forearm, initial encounter (Primary)        ED Disposition Condition    Discharge Stable          ED Prescriptions       Medication Sig Dispense Start Date End Date Auth. Provider    cephALEXin (KEFLEX) 500 MG capsule Take 1 capsule (500 mg total) by mouth 4 (four) times daily. for 5 days 20 capsule 10/20/2022 10/25/2022 Huang Singletary NP     bacitracin 500 unit/gram Oint Apply topically 2 (two) times daily. 30 g 10/20/2022 -- Huang Singletary NP          Follow-up Information    None          Huang Singletary NP  10/20/22 0652

## 2023-02-16 ENCOUNTER — HOSPITAL ENCOUNTER (EMERGENCY)
Facility: HOSPITAL | Age: 78
Discharge: HOME | End: 2023-02-16
Attending: EMERGENCY MEDICINE
Payer: COMMERCIAL

## 2023-02-16 VITALS
RESPIRATION RATE: 19 BRPM | WEIGHT: 233 LBS | DIASTOLIC BLOOD PRESSURE: 72 MMHG | OXYGEN SATURATION: 99 % | BODY MASS INDEX: 30.74 KG/M2 | SYSTOLIC BLOOD PRESSURE: 154 MMHG | TEMPERATURE: 101 F | HEART RATE: 95 BPM

## 2023-02-16 DIAGNOSIS — L03.116 LEFT LEG CELLULITIS: Primary | ICD-10-CM

## 2023-02-16 DIAGNOSIS — R06.09 DOE (DYSPNEA ON EXERTION): ICD-10-CM

## 2023-02-16 DIAGNOSIS — M79.89 LEG SWELLING: ICD-10-CM

## 2023-02-16 LAB
ALBUMIN SERPL BCP-MCNC: 3.8 G/DL (ref 3.5–5.2)
ALP SERPL-CCNC: 47 U/L (ref 55–135)
ALT SERPL W/O P-5'-P-CCNC: 13 U/L (ref 10–44)
ANION GAP SERPL CALC-SCNC: 10 MMOL/L (ref 8–16)
AST SERPL-CCNC: 19 U/L (ref 10–40)
BASOPHILS # BLD AUTO: 0.03 K/UL (ref 0–0.2)
BASOPHILS NFR BLD: 0.2 % (ref 0–1.9)
BILIRUB SERPL-MCNC: 0.7 MG/DL (ref 0.1–1)
BILIRUB UR QL STRIP: NEGATIVE
BNP SERPL-MCNC: 59 PG/ML (ref 0–99)
BUN SERPL-MCNC: 14 MG/DL (ref 8–23)
CALCIUM SERPL-MCNC: 8.9 MG/DL (ref 8.7–10.5)
CHLORIDE SERPL-SCNC: 105 MMOL/L (ref 95–110)
CLARITY UR REFRACT.AUTO: CLEAR
CO2 SERPL-SCNC: 22 MMOL/L (ref 23–29)
COLOR UR AUTO: YELLOW
CREAT SERPL-MCNC: 1.1 MG/DL (ref 0.5–1.4)
D DIMER PPP IA.FEU-MCNC: 0.6 MG/L FEU
DIFFERENTIAL METHOD: ABNORMAL
EOSINOPHIL # BLD AUTO: 0 K/UL (ref 0–0.5)
EOSINOPHIL NFR BLD: 0 % (ref 0–8)
ERYTHROCYTE [DISTWIDTH] IN BLOOD BY AUTOMATED COUNT: 11.7 % (ref 11.5–14.5)
EST. GFR  (NO RACE VARIABLE): >60 ML/MIN/1.73 M^2
GLUCOSE SERPL-MCNC: 99 MG/DL (ref 70–110)
GLUCOSE UR QL STRIP: NEGATIVE
HCT VFR BLD AUTO: 35 % (ref 40–54)
HGB BLD-MCNC: 11.9 G/DL (ref 14–18)
HGB UR QL STRIP: ABNORMAL
IMM GRANULOCYTES # BLD AUTO: 0.13 K/UL (ref 0–0.04)
IMM GRANULOCYTES NFR BLD AUTO: 0.7 % (ref 0–0.5)
KETONES UR QL STRIP: NEGATIVE
LACTATE SERPL-SCNC: 1.7 MMOL/L (ref 0.5–2.2)
LEUKOCYTE ESTERASE UR QL STRIP: NEGATIVE
LYMPHOCYTES # BLD AUTO: 0.7 K/UL (ref 1–4.8)
LYMPHOCYTES NFR BLD: 4.1 % (ref 18–48)
MCH RBC QN AUTO: 31.6 PG (ref 27–31)
MCHC RBC AUTO-ENTMCNC: 34 G/DL (ref 32–36)
MCV RBC AUTO: 93 FL (ref 82–98)
MONOCYTES # BLD AUTO: 1 K/UL (ref 0.3–1)
MONOCYTES NFR BLD: 5.6 % (ref 4–15)
NEUTROPHILS # BLD AUTO: 15.9 K/UL (ref 1.8–7.7)
NEUTROPHILS NFR BLD: 89.4 % (ref 38–73)
NITRITE UR QL STRIP: NEGATIVE
NRBC BLD-RTO: 0 /100 WBC
PH UR STRIP: 6 [PH] (ref 5–8)
PLATELET # BLD AUTO: 177 K/UL (ref 150–450)
PMV BLD AUTO: 10.1 FL (ref 9.2–12.9)
POTASSIUM SERPL-SCNC: 3.8 MMOL/L (ref 3.5–5.1)
PROT SERPL-MCNC: 6.7 G/DL (ref 6–8.4)
PROT UR QL STRIP: ABNORMAL
RBC # BLD AUTO: 3.76 M/UL (ref 4.6–6.2)
SODIUM SERPL-SCNC: 137 MMOL/L (ref 136–145)
SP GR UR STRIP: 1.01 (ref 1–1.03)
T4 FREE SERPL-MCNC: 0.95 NG/DL (ref 0.71–1.51)
TROPONIN I SERPL DL<=0.01 NG/ML-MCNC: 0.01 NG/ML (ref 0–0.03)
TSH SERPL DL<=0.005 MIU/L-ACNC: 0.37 UIU/ML (ref 0.4–4)
URN SPEC COLLECT METH UR: ABNORMAL
UROBILINOGEN UR STRIP-ACNC: 1 EU/DL
WBC # BLD AUTO: 17.76 K/UL (ref 3.9–12.7)

## 2023-02-16 PROCEDURE — 83605 ASSAY OF LACTIC ACID: CPT | Mod: ER | Performed by: EMERGENCY MEDICINE

## 2023-02-16 PROCEDURE — 87040 BLOOD CULTURE FOR BACTERIA: CPT | Mod: 59 | Performed by: EMERGENCY MEDICINE

## 2023-02-16 PROCEDURE — 99285 EMERGENCY DEPT VISIT HI MDM: CPT | Mod: 25,ER

## 2023-02-16 PROCEDURE — 85379 FIBRIN DEGRADATION QUANT: CPT | Mod: ER | Performed by: EMERGENCY MEDICINE

## 2023-02-16 PROCEDURE — 93010 ELECTROCARDIOGRAM REPORT: CPT | Mod: ,,, | Performed by: STUDENT IN AN ORGANIZED HEALTH CARE EDUCATION/TRAINING PROGRAM

## 2023-02-16 PROCEDURE — 84484 ASSAY OF TROPONIN QUANT: CPT | Mod: ER | Performed by: EMERGENCY MEDICINE

## 2023-02-16 PROCEDURE — 93010 EKG 12-LEAD: ICD-10-PCS | Mod: ,,, | Performed by: STUDENT IN AN ORGANIZED HEALTH CARE EDUCATION/TRAINING PROGRAM

## 2023-02-16 PROCEDURE — 84439 ASSAY OF FREE THYROXINE: CPT | Mod: ER | Performed by: EMERGENCY MEDICINE

## 2023-02-16 PROCEDURE — 87389 HIV-1 AG W/HIV-1&-2 AB AG IA: CPT | Performed by: EMERGENCY MEDICINE

## 2023-02-16 PROCEDURE — 93005 ELECTROCARDIOGRAM TRACING: CPT | Mod: ER

## 2023-02-16 PROCEDURE — 84443 ASSAY THYROID STIM HORMONE: CPT | Mod: ER | Performed by: EMERGENCY MEDICINE

## 2023-02-16 PROCEDURE — 86803 HEPATITIS C AB TEST: CPT | Performed by: EMERGENCY MEDICINE

## 2023-02-16 PROCEDURE — 83880 ASSAY OF NATRIURETIC PEPTIDE: CPT | Mod: ER | Performed by: EMERGENCY MEDICINE

## 2023-02-16 PROCEDURE — 96365 THER/PROPH/DIAG IV INF INIT: CPT | Mod: ER

## 2023-02-16 PROCEDURE — 85025 COMPLETE CBC W/AUTO DIFF WBC: CPT | Mod: ER | Performed by: EMERGENCY MEDICINE

## 2023-02-16 PROCEDURE — 81003 URINALYSIS AUTO W/O SCOPE: CPT | Mod: ER | Performed by: EMERGENCY MEDICINE

## 2023-02-16 PROCEDURE — 80053 COMPREHEN METABOLIC PANEL: CPT | Mod: ER | Performed by: EMERGENCY MEDICINE

## 2023-02-16 PROCEDURE — 25000003 PHARM REV CODE 250: Mod: ER | Performed by: EMERGENCY MEDICINE

## 2023-02-16 RX ORDER — NAPROXEN 500 MG/1
500 TABLET ORAL 2 TIMES DAILY WITH MEALS
Qty: 20 TABLET | Refills: 0 | Status: SHIPPED | OUTPATIENT
Start: 2023-02-16 | End: 2024-02-06

## 2023-02-16 RX ORDER — CEPHALEXIN 500 MG/1
500 CAPSULE ORAL 4 TIMES DAILY
Qty: 20 CAPSULE | Refills: 0 | Status: SHIPPED | OUTPATIENT
Start: 2023-02-16 | End: 2023-02-21

## 2023-02-16 RX ORDER — SULFAMETHOXAZOLE AND TRIMETHOPRIM 800; 160 MG/1; MG/1
1 TABLET ORAL 2 TIMES DAILY
Qty: 14 TABLET | Refills: 0 | Status: SHIPPED | OUTPATIENT
Start: 2023-02-16 | End: 2023-02-23

## 2023-02-16 RX ORDER — ACETAMINOPHEN 325 MG/1
650 TABLET ORAL
Status: COMPLETED | OUTPATIENT
Start: 2023-02-16 | End: 2023-02-16

## 2023-02-16 RX ORDER — CLINDAMYCIN PHOSPHATE 900 MG/50ML
900 INJECTION, SOLUTION INTRAVENOUS
Status: COMPLETED | OUTPATIENT
Start: 2023-02-16 | End: 2023-02-16

## 2023-02-16 RX ADMIN — ACETAMINOPHEN 650 MG: 325 TABLET ORAL at 07:02

## 2023-02-16 RX ADMIN — CLINDAMYCIN PHOSPHATE 900 MG: 900 INJECTION, SOLUTION INTRAVENOUS at 06:02

## 2023-02-16 NOTE — ED PROVIDER NOTES
Encounter Date: 2/16/2023       History     Chief Complaint   Patient presents with    Pain     Pain in both legs. Right leg pain goes into lower back and began months ago. Left leg pain is from groin to knee and began today. Also SOB with exertion x months.      Somewhat vague historian.  Underlying history myasthenia gravis, previously on Mestinon but apparently no longer taking, previously followed by Neurology but seems presently not to have either primary care or Neurology follow-up.  No known history of heart disease but does report chronic lower extremity edema and has been on diuretics in the past, etiology unclear.  States he has not previously had any form of venous thromboembolic disease.  Chronic pain in the back radiating down the right leg of uncertain etiology.  Now has left-sided leg pain from his groin to his knee and distal to the knee noticed today.  Possible fever/ chills, no sweats.  Somewhat dyspneic with exertion for months or longer, no dyspnea at rest or while lying supine.  No other specific complaints.    The history is provided by the patient. No  was used.   Review of patient's allergies indicates:  No Known Allergies  Past Medical History:   Diagnosis Date    Arthritis     Myasthenia gravis      Past Surgical History:   Procedure Laterality Date    ROTATOR CUFF REPAIR Left      History reviewed. No pertinent family history.  Social History     Tobacco Use    Smoking status: Former    Smokeless tobacco: Never   Substance Use Topics    Alcohol use: Yes     Comment: occasional beer    Drug use: No     Review of Systems   Constitutional:  Positive for fever. Negative for chills.   HENT:  Negative for congestion, facial swelling, nosebleeds and sinus pressure.    Eyes:  Negative for pain and redness.   Respiratory:  Positive for shortness of breath. Negative for chest tightness and wheezing.    Cardiovascular:  Positive for leg swelling. Negative for chest pain and  palpitations.   Gastrointestinal:  Negative for abdominal distention, abdominal pain, diarrhea, nausea and vomiting.   Endocrine: Negative for cold intolerance, polydipsia and polyphagia.   Genitourinary:  Negative for difficulty urinating, dysuria, frequency and hematuria.   Musculoskeletal:  Positive for myalgias. Negative for arthralgias, back pain and neck pain.   Skin:  Positive for rash. Negative for color change.   Neurological:  Negative for dizziness, weakness, numbness and headaches.   Hematological:  Negative for adenopathy. Does not bruise/bleed easily.   Psychiatric/Behavioral:  Negative for agitation and behavioral problems.    All other systems reviewed and are negative.    Physical Exam     Initial Vitals [02/16/23 1602]   BP Pulse Resp Temp SpO2   (!) 160/73 108 20 100 °F (37.8 °C) 98 %      MAP       --         Physical Exam    Nursing note and vitals reviewed.  Constitutional: He appears well-developed and well-nourished. He is not diaphoretic. No distress.   HENT:   Head: Normocephalic and atraumatic.   Mouth/Throat: Oropharynx is clear and moist. No oropharyngeal exudate.   Eyes: Conjunctivae and EOM are normal. Pupils are equal, round, and reactive to light. Right eye exhibits no discharge. Left eye exhibits no discharge. No scleral icterus.   Neck: Neck supple. No thyromegaly present. No tracheal deviation present. No JVD present.   Normal range of motion.  Cardiovascular:  Normal rate, regular rhythm and normal heart sounds.     Exam reveals no gallop and no friction rub.       No murmur heard.  Pulmonary/Chest: Breath sounds normal. No respiratory distress. He has no wheezes. He has no rhonchi. He has no rales. He exhibits no tenderness.   Abdominal: Abdomen is soft. Bowel sounds are normal. He exhibits no distension and no mass. There is no abdominal tenderness. There is no rebound and no guarding.   Musculoskeletal:         General: Edema present. No tenderness. Normal range of motion.       Cervical back: Normal range of motion and neck supple.      Comments: Indicates a diffuse area of the low back and has region of discomfort, some indication of much of the right lower leg and proximal left lower leg and generalized areas of pain as well.  No overt orthopedic findings.  Fully ambulatory.     Lymphadenopathy:     He has no cervical adenopathy.   Neurological: He is alert and oriented to person, place, and time. He has normal strength. No cranial nerve deficit.   Skin: Skin is warm and dry. No rash noted. There is erythema.   Bilateral lower extremity edema, pitting to thighs, left greater than right, soft.  Poorly localizing erythema and slight warmth involve the left lower extremity, most notably anterior shin, skin pigmentation makes definition extent of erythema difficulty.  No foot lesions, abscess, or apparent lymphangitis detected.  Consider cellulitis of the left lower leg.   Psychiatric: He has a normal mood and affect. His behavior is normal. Judgment and thought content normal.       ED Course   Procedures  Labs Reviewed   CBC W/ AUTO DIFFERENTIAL - Abnormal; Notable for the following components:       Result Value    WBC 17.76 (*)     RBC 3.76 (*)     Hemoglobin 11.9 (*)     Hematocrit 35.0 (*)     MCH 31.6 (*)     Immature Granulocytes 0.7 (*)     Gran # (ANC) 15.9 (*)     Immature Grans (Abs) 0.13 (*)     Lymph # 0.7 (*)     Gran % 89.4 (*)     Lymph % 4.1 (*)     All other components within normal limits   COMPREHENSIVE METABOLIC PANEL - Abnormal; Notable for the following components:    CO2 22 (*)     Alkaline Phosphatase 47 (*)     All other components within normal limits   D DIMER, QUANTITATIVE - Abnormal; Notable for the following components:    D-Dimer 0.60 (*)     All other components within normal limits   URINALYSIS, REFLEX TO URINE CULTURE - Abnormal; Notable for the following components:    Protein, UA Trace (*)     Occult Blood UA Trace (*)     All other components within  normal limits    Narrative:     Specimen Source->Urine   CULTURE, BLOOD   CULTURE, BLOOD   LACTIC ACID, PLASMA   B-TYPE NATRIURETIC PEPTIDE   TROPONIN I   HIV 1 / 2 ANTIBODY   HEPATITIS C ANTIBODY   HEP C VIRUS HOLD SPECIMEN   TSH     Results for orders placed or performed during the hospital encounter of 02/16/23   CBC auto differential   Result Value Ref Range    WBC 17.76 (H) 3.90 - 12.70 K/uL    RBC 3.76 (L) 4.60 - 6.20 M/uL    Hemoglobin 11.9 (L) 14.0 - 18.0 g/dL    Hematocrit 35.0 (L) 40.0 - 54.0 %    MCV 93 82 - 98 fL    MCH 31.6 (H) 27.0 - 31.0 pg    MCHC 34.0 32.0 - 36.0 g/dL    RDW 11.7 11.5 - 14.5 %    Platelets 177 150 - 450 K/uL    MPV 10.1 9.2 - 12.9 fL    Immature Granulocytes 0.7 (H) 0.0 - 0.5 %    Gran # (ANC) 15.9 (H) 1.8 - 7.7 K/uL    Immature Grans (Abs) 0.13 (H) 0.00 - 0.04 K/uL    Lymph # 0.7 (L) 1.0 - 4.8 K/uL    Mono # 1.0 0.3 - 1.0 K/uL    Eos # 0.0 0.0 - 0.5 K/uL    Baso # 0.03 0.00 - 0.20 K/uL    nRBC 0 0 /100 WBC    Gran % 89.4 (H) 38.0 - 73.0 %    Lymph % 4.1 (L) 18.0 - 48.0 %    Mono % 5.6 4.0 - 15.0 %    Eosinophil % 0.0 0.0 - 8.0 %    Basophil % 0.2 0.0 - 1.9 %    Differential Method Automated    Comprehensive metabolic panel   Result Value Ref Range    Sodium 137 136 - 145 mmol/L    Potassium 3.8 3.5 - 5.1 mmol/L    Chloride 105 95 - 110 mmol/L    CO2 22 (L) 23 - 29 mmol/L    Glucose 99 70 - 110 mg/dL    BUN 14 8 - 23 mg/dL    Creatinine 1.1 0.5 - 1.4 mg/dL    Calcium 8.9 8.7 - 10.5 mg/dL    Total Protein 6.7 6.0 - 8.4 g/dL    Albumin 3.8 3.5 - 5.2 g/dL    Total Bilirubin 0.7 0.1 - 1.0 mg/dL    Alkaline Phosphatase 47 (L) 55 - 135 U/L    AST 19 10 - 40 U/L    ALT 13 10 - 44 U/L    Anion Gap 10 8 - 16 mmol/L    eGFR >60.0 >60 mL/min/1.73 m^2   Lactic acid, plasma   Result Value Ref Range    Lactate (Lactic Acid) 1.7 0.5 - 2.2 mmol/L   B-Type natriuretic peptide (BNP)   Result Value Ref Range    BNP 59 0 - 99 pg/mL   Troponin I   Result Value Ref Range    Troponin I 0.006 0.000 - 0.026  ng/mL   D dimer, quantitative   Result Value Ref Range    D-Dimer 0.60 (H) <0.50 mg/L FEU   Urinalysis, Reflex to Urine Culture Urine, Clean Catch    Specimen: Urine   Result Value Ref Range    Specimen UA Urine, Clean Catch     Color, UA Yellow Yellow, Straw, Berta    Appearance, UA Clear Clear    pH, UA 6.0 5.0 - 8.0    Specific Gravity, UA 1.015 1.005 - 1.030    Protein, UA Trace (A) Negative    Glucose, UA Negative Negative    Ketones, UA Negative Negative    Bilirubin (UA) Negative Negative    Occult Blood UA Trace (A) Negative    Nitrite, UA Negative Negative    Urobilinogen, UA 1.0 <2.0 EU/dL    Leukocytes, UA Negative Negative       EKG Readings: (Independently Interpreted)   Initial Reading: No STEMI. Rhythm: Normal Sinus Rhythm. Heart Rate: 93. Ectopy: No Ectopy. Conduction: Normal.   Nonspecific T-wave abnormality     Imaging Results              X-Ray Chest PA And Lateral (Final result)  Result time 02/16/23 17:45:17      Final result by Magno Sellers MD (02/16/23 17:45:17)                   Impression:      No acute process seen      Electronically signed by: Marlo Kiran  Date:    02/16/2023  Time:    17:45               Narrative:    EXAMINATION:  XR CHEST PA AND LATERAL    CLINICAL HISTORY:  Chest Pain;    TECHNIQUE:  PA and lateral views of the chest were performed.    COMPARISON:  Atherosclerotic changes.    FINDINGS:  Lungs are clear.  No acute osseous injury.  Cardiac silhouette within normal limits.  Atherosclerotic changes.                                       US Lower Extremity Veins Bilateral (Final result)  Result time 02/16/23 17:26:09      Final result by Magno Sellers MD (02/16/23 17:26:09)                   Impression:      No evidence of deep venous thrombosis in either lower extremity.    Right bakers cyst measuring 4.4 x 1.3 x 2.4 cm.    Left groin multiple lymph nodes, largest 3.4 x 2.0 x 3.3 cm.      Electronically signed by: Marlo Kiran  Date:    02/16/2023  Time:    17:26                Narrative:    EXAMINATION:  US LOWER EXTREMITY VEINS BILATERAL    CLINICAL HISTORY:  Other specified soft tissue disorders    TECHNIQUE:  Duplex and color flow Doppler and dynamic compression was performed of the bilateral lower extremity veins was performed.    COMPARISON:  None    FINDINGS:  Right thigh veins: The common femoral, femoral, popliteal, upper greater saphenous, and deep femoral veins are patent and free of thrombus. The veins are normally compressible and have normal phasic flow and augmentation response.    Right calf veins: The visualized calf veins are patent.    Left thigh veins: The common femoral, femoral, popliteal, upper greater saphenous, and deep femoral veins are patent and free of thrombus. The veins are normally compressible and have normal phasic flow and augmentation response.    Left calf veins: The visualized calf veins are patent.    Miscellaneous: Right bakers cyst measuring 4.4 x 1.3 x 2.4 cm.    Left groin multiple lymph nodes, largest 3.4 x 2.0 x 3.3 cm.                                       Medications   clindamycin in D5W 900 mg/50 mL IVPB 900 mg (900 mg Intravenous New Bag 2/16/23 1847)     Medical Decision Making:   Initial Assessment:   77 yo male with left leg redness, swelling, pain PE c/w cellulitis  Differential Diagnosis:   Cellulitis, DVT, CHF, Venous insufficiency, Edema  Independently Interpreted Test(s):   I have ordered and independently interpreted EKG Reading(s) - see prior notes  Clinical Tests:   Lab Tests: Ordered and Reviewed  The following lab test(s) were unremarkable: CBC, CMP, Urinalysis and Troponin       <> Summary of Lab: Labs Reviewed  CBC W/ AUTO DIFFERENTIAL - Abnormal; Notable for the following components:      Result Value   WBC 17.76 (*)    RBC 3.76 (*)    Hemoglobin 11.9 (*)    Hematocrit 35.0 (*)    MCH 31.6 (*)    Immature Granulocytes 0.7 (*)    Gran # (ANC) 15.9 (*)    Immature Grans (Abs) 0.13 (*)    Lymph # 0.7 (*)    Gran  % 89.4 (*)    Lymph % 4.1 (*)    All other components within normal limits  COMPREHENSIVE METABOLIC PANEL - Abnormal; Notable for the following components:   CO2 22 (*)    Alkaline Phosphatase 47 (*)    All other components within normal limits  D DIMER, QUANTITATIVE - Abnormal; Notable for the following components:   D-Dimer 0.60 (*)    All other components within normal limits  URINALYSIS, REFLEX TO URINE CULTURE - Abnormal; Notable for the following components:   Protein, UA Trace (*)    Occult Blood UA Trace (*)    All other components within normal limits   Narrative:    Specimen Source->Urine  CULTURE, BLOOD  CULTURE, BLOOD  LACTIC ACID, PLASMA  B-TYPE NATRIURETIC PEPTIDE  TROPONIN I  HIV 1 / 2 ANTIBODY  HEPATITIS C ANTIBODY  HEP C VIRUS HOLD SPECIMEN  TSH    Radiological Study: Ordered and Reviewed  Medical Tests: Ordered and Reviewed  ED Management:  Antibiotics administered, Prescriptions given for outpt trial of therapy and RTERI discussed                 5:37 PM Ultrasound negative for DVT, incidental Baker cyst on the right, notable adenopathy in the inguinal region on the left would tend to support a diagnosis of cellulitis.    7:09 PM - Counseling: Spoke with the patient and discussed todays findings, in addition to providing specific details for the plan of care and counseling regarding the diagnosis and prognosis. Questions are answered at this time.         Clinical Impression:   Final diagnoses:  [M79.89] Leg swelling  [R06.09] PERSAUD (dyspnea on exertion)  [L03.116] Left leg cellulitis (Primary)        ED Disposition Condition    Discharge Stable          ED Prescriptions       Medication Sig Dispense Start Date End Date Auth. Provider    sulfamethoxazole-trimethoprim 800-160mg (BACTRIM DS) 800-160 mg Tab Take 1 tablet by mouth 2 (two) times daily. for 7 days 14 tablet 2/16/2023 2/23/2023 Vin Franco MD    cephALEXin (KEFLEX) 500 MG capsule Take 1 capsule (500 mg total) by mouth 4  (four) times daily. for 5 days 20 capsule 2/16/2023 2/21/2023 Vin Franco MD    naproxen (NAPROSYN) 500 MG tablet Take 1 tablet (500 mg total) by mouth 2 (two) times daily with meals. 20 tablet 2/16/2023 -- Vin Franco MD          Follow-up Information       Follow up With Specialties Details Why Contact Info    Forrest Dubois MD Family Medicine Call in 2 days  [id number] [facility]  [place] [address]  [phone]      [facility] Emergency Dept Emergency Medicine  If symptoms worsen [address]  [facility] [id number] [phone]             Vin Franco MD  02/16/23 3750

## 2023-02-17 LAB
HCV AB SERPL QL IA: NEGATIVE
HEP C VIRUS HOLD SPECIMEN: NORMAL
HIV 1+2 AB+HIV1 P24 AG SERPL QL IA: NEGATIVE

## 2023-02-22 LAB
BACTERIA BLD CULT: NORMAL
BACTERIA BLD CULT: NORMAL

## 2024-01-22 ENCOUNTER — HOSPITAL ENCOUNTER (OUTPATIENT)
Dept: RADIOLOGY | Facility: HOSPITAL | Age: 79
Discharge: HOME | End: 2024-01-22
Attending: STUDENT IN AN ORGANIZED HEALTH CARE EDUCATION/TRAINING PROGRAM
Payer: COMMERCIAL

## 2024-01-22 ENCOUNTER — OFFICE VISIT (OUTPATIENT)
Dept: INTERNAL MEDICINE | Facility: CLINIC | Age: 79
End: 2024-01-22
Payer: COMMERCIAL

## 2024-01-22 VITALS
SYSTOLIC BLOOD PRESSURE: 142 MMHG | OXYGEN SATURATION: 95 % | DIASTOLIC BLOOD PRESSURE: 80 MMHG | RESPIRATION RATE: 14 BRPM | HEIGHT: 73 IN | HEART RATE: 103 BPM | WEIGHT: 230.19 LBS | TEMPERATURE: 97 F | BODY MASS INDEX: 30.51 KG/M2

## 2024-01-22 DIAGNOSIS — G70.00 MYASTHENIA GRAVIS: ICD-10-CM

## 2024-01-22 DIAGNOSIS — D51.0 PERNICIOUS ANEMIA: ICD-10-CM

## 2024-01-22 DIAGNOSIS — J10.1 INFLUENZA A: ICD-10-CM

## 2024-01-22 DIAGNOSIS — M17.0 BILATERAL PRIMARY OSTEOARTHRITIS OF KNEE: ICD-10-CM

## 2024-01-22 DIAGNOSIS — M54.12 CERVICAL RADICULOPATHY: ICD-10-CM

## 2024-01-22 DIAGNOSIS — Z76.89 ENCOUNTER TO ESTABLISH CARE: Primary | ICD-10-CM

## 2024-01-22 DIAGNOSIS — R05.1 ACUTE COUGH: ICD-10-CM

## 2024-01-22 LAB
CTP QC/QA: YES
CTP QC/QA: YES
POC MOLECULAR INFLUENZA A AGN: POSITIVE
POC MOLECULAR INFLUENZA B AGN: NEGATIVE
SARS-COV-2 RDRP RESP QL NAA+PROBE: NEGATIVE

## 2024-01-22 PROCEDURE — 3079F DIAST BP 80-89 MM HG: CPT | Mod: CPTII,S$GLB,, | Performed by: STUDENT IN AN ORGANIZED HEALTH CARE EDUCATION/TRAINING PROGRAM

## 2024-01-22 PROCEDURE — 72040 X-RAY EXAM NECK SPINE 2-3 VW: CPT | Mod: TC,PO

## 2024-01-22 PROCEDURE — 72040 X-RAY EXAM NECK SPINE 2-3 VW: CPT | Mod: 26,,, | Performed by: RADIOLOGY

## 2024-01-22 PROCEDURE — 1126F AMNT PAIN NOTED NONE PRSNT: CPT | Mod: CPTII,S$GLB,, | Performed by: STUDENT IN AN ORGANIZED HEALTH CARE EDUCATION/TRAINING PROGRAM

## 2024-01-22 PROCEDURE — 73564 X-RAY EXAM KNEE 4 OR MORE: CPT | Mod: 26,,, | Performed by: RADIOLOGY

## 2024-01-22 PROCEDURE — 3077F SYST BP >= 140 MM HG: CPT | Mod: CPTII,S$GLB,, | Performed by: STUDENT IN AN ORGANIZED HEALTH CARE EDUCATION/TRAINING PROGRAM

## 2024-01-22 PROCEDURE — 87635 SARS-COV-2 COVID-19 AMP PRB: CPT | Mod: QW,S$GLB,, | Performed by: STUDENT IN AN ORGANIZED HEALTH CARE EDUCATION/TRAINING PROGRAM

## 2024-01-22 PROCEDURE — 3288F FALL RISK ASSESSMENT DOCD: CPT | Mod: CPTII,S$GLB,, | Performed by: STUDENT IN AN ORGANIZED HEALTH CARE EDUCATION/TRAINING PROGRAM

## 2024-01-22 PROCEDURE — 73564 X-RAY EXAM KNEE 4 OR MORE: CPT | Mod: TC,50,PO

## 2024-01-22 PROCEDURE — 99999 PR PBB SHADOW E&M-EST. PATIENT-LVL IV: CPT | Mod: PBBFAC,,, | Performed by: STUDENT IN AN ORGANIZED HEALTH CARE EDUCATION/TRAINING PROGRAM

## 2024-01-22 PROCEDURE — 99204 OFFICE O/P NEW MOD 45 MIN: CPT | Mod: S$GLB,,, | Performed by: STUDENT IN AN ORGANIZED HEALTH CARE EDUCATION/TRAINING PROGRAM

## 2024-01-22 PROCEDURE — 1101F PT FALLS ASSESS-DOCD LE1/YR: CPT | Mod: CPTII,S$GLB,, | Performed by: STUDENT IN AN ORGANIZED HEALTH CARE EDUCATION/TRAINING PROGRAM

## 2024-01-22 PROCEDURE — 87502 INFLUENZA DNA AMP PROBE: CPT | Mod: QW,S$GLB,, | Performed by: STUDENT IN AN ORGANIZED HEALTH CARE EDUCATION/TRAINING PROGRAM

## 2024-01-22 RX ORDER — OSELTAMIVIR PHOSPHATE 75 MG/1
75 CAPSULE ORAL 2 TIMES DAILY
Qty: 10 CAPSULE | Refills: 0 | Status: SHIPPED | OUTPATIENT
Start: 2024-01-22 | End: 2024-01-27

## 2024-01-22 RX ORDER — CYANOCOBALAMIN 1000 UG/ML
1000 INJECTION, SOLUTION INTRAMUSCULAR; SUBCUTANEOUS
Qty: 3 ML | Refills: 0 | Status: SHIPPED | OUTPATIENT
Start: 2024-01-22 | End: 2024-04-21

## 2024-01-22 RX ORDER — PROMETHAZINE HYDROCHLORIDE AND DEXTROMETHORPHAN HYDROBROMIDE 6.25; 15 MG/5ML; MG/5ML
5 SYRUP ORAL EVERY 8 HOURS PRN
Qty: 50 ML | Refills: 0 | Status: SHIPPED | OUTPATIENT
Start: 2024-01-22 | End: 2024-02-01

## 2024-01-22 RX ORDER — GABAPENTIN 300 MG/1
300 CAPSULE ORAL NIGHTLY
Qty: 90 CAPSULE | Refills: 0 | Status: SHIPPED | OUTPATIENT
Start: 2024-01-22 | End: 2024-02-06

## 2024-01-22 RX ORDER — BENZONATATE 100 MG/1
100 CAPSULE ORAL 3 TIMES DAILY PRN
Qty: 30 CAPSULE | Refills: 0 | Status: SHIPPED | OUTPATIENT
Start: 2024-01-22 | End: 2024-02-01

## 2024-01-22 NOTE — PROGRESS NOTES
"  Chief Complaint   Patient presents with    Cough     HPI: Kobi Covington is a 79 y.o. male  with Pmhx listed below who presents to clinic for establishing care.  Review of the chart reveals that he was previously followed by Dr. Dubois here in [facility].  Dr. Dubois is no longer practicing medicine and hence he wishes to establish care here with a [facility].  He is here with multiple complaints today as reported below  Cough:  As per the patient has been feeling bad with symptoms of dry cough which he reports as" gut coming up" for last few days. It is associated with chills, runny nose, congestion, body aches. He has not taken any medication yet. He denies having sick contact. Poct flu and COVID done in clinic was positive for influenza A infection.  Hand numbness: as per the patient, he has been complaining of numbness in his hand bilaterally, where right is worse then the left for last 1 year. He was treated with gabapentin by his previous provider, which  made him sleepy and hence he stop taking this medication. He reports that the numbness in extending up to his forearm. He also reports to have occasional neck pain as well. He has not had xray done in his neck recently and will get it done. Discussed about PT to which he reports that he tried getting it done in the past which didn't help him and doesn't want to do it any more.  Knee pain: As per the patient, he has been having bilateral knee pain for last several years. He reports it to be in the front of knee, worse with movement. He has not had xray done in his knees yet and will get one done today.    Review of chart revealed that he has h/o ocular myasthenia gravis and was following neurologist in the past. He was on Mestinon which he is not taking. His last visit with neurologist dr. Bella was on 2021 . He now takes prednisone as and when needed basis. His blood pressure is elevated here in the clinic . He is not taking any medication for it yet. " Discussed about keeping BP log and will go over it in 2 weeks. He voiced understanding.     Problem List:  Patient Active Problem List   Diagnosis    Forearm laceration, right, initial encounter    Myasthenia gravis    Hyperlipidemia    Pernicious anemia       ROS: Negative except as noted above.       Current Meds:  Current Outpatient Medications   Medication Sig Dispense Refill    predniSONE (DELTASONE) 10 MG tablet Take 10 mg by mouth once daily.      benzonatate (TESSALON) 100 MG capsule Take 1 capsule (100 mg total) by mouth 3 (three) times daily as needed for Cough. 30 capsule 0    cyanocobalamin 1,000 mcg/mL injection Inject 1 mL (1,000 mcg total) into the muscle every 30 days. 3 mL 0    furosemide (LASIX) 20 MG tablet Take 1 tablet (20 mg total) by mouth once daily. 30 tablet 0    gabapentin (NEURONTIN) 300 MG capsule Take 1 capsule (300 mg total) by mouth every evening. 90 capsule 0    hydroCHLOROthiazide (HYDRODIURIL) 12.5 MG Tab Take 1 tablet (12.5 mg total) by mouth once daily. 30 tablet 0    naproxen (NAPROSYN) 500 MG tablet Take 1 tablet (500 mg total) by mouth 2 (two) times daily with meals. (Patient not taking: Reported on 1/22/2024) 20 tablet 0    oseltamivir (TAMIFLU) 75 MG capsule Take 1 capsule (75 mg total) by mouth 2 (two) times daily. (Start taking ASAP) for 5 days 10 capsule 0    promethazine-dextromethorphan (PROMETHAZINE-DM) 6.25-15 mg/5 mL Syrp Take 5 mLs by mouth every 8 (eight) hours as needed. 50 mL 0     No current facility-administered medications for this visit.      PE:  BP: (!) 142/80  Pulse: 103 Resp: 14   Temp: 96.6 °F (35.9 °C)  Weight: 104.4 kg (230 lb 2.6 oz) Body mass index is 30.37 kg/m².    Wt Readings from Last 5 Encounters:   01/22/24 104.4 kg (230 lb 2.6 oz)   02/16/23 105.7 kg (233 lb 0.4 oz)   10/20/22 97.5 kg (214 lb 15.2 oz)   02/04/22 102.7 kg (226 lb 6.6 oz)   01/10/22 102.2 kg (225 lb 3.2 oz)     General appearance: alert and cooperative, not in acute  distress  Head: normocephalic, without obvious abnormality, atraumatic  Eyes: conjunctivae/corneas clear. PERRL, EOM's intact.  Ears: clear tympanic membranes   Neck: no adenopathy, supple, symmetrical, trachea midline and thyroid not enlarged, symmetric, no tenderness/mass/nodules, no JVD  Throat: lips, mucosa, and tongue normal; teeth and gums normal; no thrush  Chest: no reproducible chest pain   Heart: regular rate and rhythm, S1, S2 normal, no murmur, click, rub or gallop  Lungs: unlabored respiration, bilateral equal air entry, normal vesicular breath sound heard, no wheezing, rhonchi   Abdomen: soft, non-tender, non-distended; bowel sounds +; no masses,  no organomegaly, no ascites   Extremities: normal, atraumatic, no cyanosis or edema noted B/L upper and lower extremities.  Skin: skin color, texture, turgor normal. No rashes or lesions noted.  Neurologic: grossly intact      Lab:  Lab Results   Component Value Date    WBC 3.4 11/20/2023    HGB 12.8 (L) 11/20/2023    HCT 38.4 11/20/2023    MCV 93 02/16/2023     11/20/2023     02/16/2023    K 3.8 02/16/2023     02/16/2023    CO2 22 (L) 02/16/2023    BUN 14 02/16/2023    GLU 99 02/16/2023    CALCIUM 8.9 02/16/2023    AST 19 02/16/2023    ALT 13 02/16/2023    CHOL 240 (H) 11/20/2023    HDL 75 11/20/2023    LDLCALC 145 (H) 11/20/2023    TRIG 117 11/20/2023    TSH 1.220 11/20/2023    INR 1.0 04/02/2015       Impression:    ICD-10-CM ICD-9-CM    1. Encounter to establish care  Z76.89 V65.8 CBC W/ AUTO DIFFERENTIAL      COMPREHENSIVE METABOLIC PANEL      HEMOGLOBIN A1C      Lipid Panel      2. Acute cough  R05.1 786.2 oseltamivir (TAMIFLU) 75 MG capsule      promethazine-dextromethorphan (PROMETHAZINE-DM) 6.25-15 mg/5 mL Syrp      benzonatate (TESSALON) 100 MG capsule      POCT Influenza A/B Molecular      POCT COVID-19 Rapid Screening      3. Myasthenia gravis  G70.00 358.00       4. Cervical radiculopathy  M54.12 723.4 X-Ray Cervical Spine 2 or  3 Views      EMG W/ ULTRASOUND AND NERVE CONDUCTION TEST 2 Extremities      5. Influenza A  J10.1 487.1       6. Bilateral primary osteoarthritis of knee  M17.0 715.16 X-Ray Knee Complete 4 Or More Views Bilat      7. Pernicious anemia  D51.0 281.0 cyanocobalamin 1,000 mcg/mL injection        1. Encounter to establish care  - CBC W/ AUTO DIFFERENTIAL; Future  - COMPREHENSIVE METABOLIC PANEL; Future  - HEMOGLOBIN A1C; Future  - Lipid Panel; Future    2. Acute cough  - oseltamivir (TAMIFLU) 75 MG capsule; Take 1 capsule (75 mg total) by mouth 2 (two) times daily. (Start taking ASAP) for 5 days  Dispense: 10 capsule; Refill: 0  - promethazine-dextromethorphan (PROMETHAZINE-DM) 6.25-15 mg/5 mL Syrp; Take 5 mLs by mouth every 8 (eight) hours as needed.  Dispense: 50 mL; Refill: 0  - benzonatate (TESSALON) 100 MG capsule; Take 1 capsule (100 mg total) by mouth 3 (three) times daily as needed for Cough.  Dispense: 30 capsule; Refill: 0  - POCT Influenza A/B Molecular  - POCT COVID-19 Rapid Screening    3. Myasthenia gravis  - denies having any symptoms today  On prednisone PRN basis    4. Cervical radiculopathy  - gabapentin (NEURONTIN) 300 MG capsule; Take 1 capsule (300 mg total) by mouth every evening.  Dispense: 90 capsule; Refill: 0  - X-Ray Cervical Spine 2 or 3 Views; Future  - EMG W/ ULTRASOUND AND NERVE CONDUCTION TEST 2 Extremities; Future    5. Influenza A  Continue with oseltamivir prescribed today    6. Bilateral primary osteoarthritis of knee    - X-Ray Knee Complete 4 Or More Views Bilat; Future    7. Pernicious anemia  -   2 mo ago     Vitamin B12 Level 232 - 1,245 pg/mL 158 Low    On monthly injection   - cyanocobalamin 1,000 mcg/mL injection; Inject 1 mL (1,000 mcg total) into the muscle every 30 days.  Dispense: 3 mL; Refill: 0    Future Appointments   Date Time Provider Department Center   1/22/2024  5:45 PM [facility] XR1 [facility] [place]   1/23/2024  8:50 AM [facility] LABORATORY [facility] [place]   2/5/2024   4:40 PM Rashi Barbosa MD [facility] [place]     Rtc in 2 weeks with bp log for htn      Rashi Barbosa MD

## 2024-01-23 ENCOUNTER — LAB VISIT (OUTPATIENT)
Dept: LAB | Facility: HOSPITAL | Age: 79
End: 2024-01-23
Attending: STUDENT IN AN ORGANIZED HEALTH CARE EDUCATION/TRAINING PROGRAM
Payer: COMMERCIAL

## 2024-01-23 ENCOUNTER — TELEPHONE (OUTPATIENT)
Dept: PHYSICAL MEDICINE AND REHAB | Facility: CLINIC | Age: 79
End: 2024-01-23
Payer: COMMERCIAL

## 2024-01-23 DIAGNOSIS — M17.0 BILATERAL PRIMARY OSTEOARTHRITIS OF KNEE: Primary | ICD-10-CM

## 2024-01-23 DIAGNOSIS — Z76.89 ENCOUNTER TO ESTABLISH CARE: ICD-10-CM

## 2024-01-23 LAB
ALBUMIN SERPL BCP-MCNC: 3.5 G/DL (ref 3.5–5.2)
ALP SERPL-CCNC: 48 U/L (ref 55–135)
ALT SERPL W/O P-5'-P-CCNC: 24 U/L (ref 10–44)
ANION GAP SERPL CALC-SCNC: 10 MMOL/L (ref 8–16)
AST SERPL-CCNC: 32 U/L (ref 10–40)
BASOPHILS # BLD AUTO: 0.03 K/UL (ref 0–0.2)
BASOPHILS NFR BLD: 0.8 % (ref 0–1.9)
BILIRUB SERPL-MCNC: 0.6 MG/DL (ref 0.1–1)
BUN SERPL-MCNC: 12 MG/DL (ref 8–23)
CALCIUM SERPL-MCNC: 8.5 MG/DL (ref 8.7–10.5)
CHLORIDE SERPL-SCNC: 105 MMOL/L (ref 95–110)
CHOLEST SERPL-MCNC: 204 MG/DL (ref 120–199)
CHOLEST/HDLC SERPL: 3.5 {RATIO} (ref 2–5)
CO2 SERPL-SCNC: 24 MMOL/L (ref 23–29)
CREAT SERPL-MCNC: 1 MG/DL (ref 0.5–1.4)
DIFFERENTIAL METHOD BLD: ABNORMAL
EOSINOPHIL # BLD AUTO: 0.1 K/UL (ref 0–0.5)
EOSINOPHIL NFR BLD: 1.8 % (ref 0–8)
ERYTHROCYTE [DISTWIDTH] IN BLOOD BY AUTOMATED COUNT: 11.7 % (ref 11.5–14.5)
EST. GFR  (NO RACE VARIABLE): >60 ML/MIN/1.73 M^2
GLUCOSE SERPL-MCNC: 103 MG/DL (ref 70–110)
HCT VFR BLD AUTO: 36 % (ref 40–54)
HDLC SERPL-MCNC: 58 MG/DL (ref 40–75)
HDLC SERPL: 28.4 % (ref 20–50)
HGB BLD-MCNC: 11.7 G/DL (ref 14–18)
IMM GRANULOCYTES # BLD AUTO: 0.01 K/UL (ref 0–0.04)
IMM GRANULOCYTES NFR BLD AUTO: 0.3 % (ref 0–0.5)
LDLC SERPL CALC-MCNC: 128.8 MG/DL (ref 63–159)
LYMPHOCYTES # BLD AUTO: 1.2 K/UL (ref 1–4.8)
LYMPHOCYTES NFR BLD: 31.6 % (ref 18–48)
MCH RBC QN AUTO: 30.9 PG (ref 27–31)
MCHC RBC AUTO-ENTMCNC: 32.5 G/DL (ref 32–36)
MCV RBC AUTO: 95 FL (ref 82–98)
MONOCYTES # BLD AUTO: 0.9 K/UL (ref 0.3–1)
MONOCYTES NFR BLD: 22.5 % (ref 4–15)
NEUTROPHILS # BLD AUTO: 1.7 K/UL (ref 1.8–7.7)
NEUTROPHILS NFR BLD: 43 % (ref 38–73)
NONHDLC SERPL-MCNC: 146 MG/DL
NRBC BLD-RTO: 0 /100 WBC
PLATELET # BLD AUTO: 168 K/UL (ref 150–450)
PMV BLD AUTO: 9.3 FL (ref 9.2–12.9)
POTASSIUM SERPL-SCNC: 3.8 MMOL/L (ref 3.5–5.1)
PROT SERPL-MCNC: 6.5 G/DL (ref 6–8.4)
RBC # BLD AUTO: 3.79 M/UL (ref 4.6–6.2)
SODIUM SERPL-SCNC: 139 MMOL/L (ref 136–145)
TRIGL SERPL-MCNC: 86 MG/DL (ref 30–150)
WBC # BLD AUTO: 3.83 K/UL (ref 3.9–12.7)

## 2024-01-23 PROCEDURE — 36415 COLL VENOUS BLD VENIPUNCTURE: CPT | Mod: PO | Performed by: STUDENT IN AN ORGANIZED HEALTH CARE EDUCATION/TRAINING PROGRAM

## 2024-01-23 PROCEDURE — 83036 HEMOGLOBIN GLYCOSYLATED A1C: CPT | Performed by: STUDENT IN AN ORGANIZED HEALTH CARE EDUCATION/TRAINING PROGRAM

## 2024-01-23 PROCEDURE — 80053 COMPREHEN METABOLIC PANEL: CPT | Mod: PO | Performed by: STUDENT IN AN ORGANIZED HEALTH CARE EDUCATION/TRAINING PROGRAM

## 2024-01-23 PROCEDURE — 85025 COMPLETE CBC W/AUTO DIFF WBC: CPT | Mod: PO | Performed by: STUDENT IN AN ORGANIZED HEALTH CARE EDUCATION/TRAINING PROGRAM

## 2024-01-23 PROCEDURE — 80061 LIPID PANEL: CPT | Performed by: STUDENT IN AN ORGANIZED HEALTH CARE EDUCATION/TRAINING PROGRAM

## 2024-01-23 NOTE — TELEPHONE ENCOUNTER
----- Message from Sri Blake LPN sent at 1/23/2024  1:44 PM CST -----  Hello can you please assist us in getting this pt scheduled for nerve conduction test. Referral was put in yesterday. Thanks!

## 2024-01-24 ENCOUNTER — TELEPHONE (OUTPATIENT)
Dept: NEUROLOGY | Facility: CLINIC | Age: 79
End: 2024-01-24
Payer: COMMERCIAL

## 2024-01-24 LAB
ESTIMATED AVG GLUCOSE: 117 MG/DL (ref 68–131)
HBA1C MFR BLD: 5.7 % (ref 4–5.6)

## 2024-02-06 ENCOUNTER — OFFICE VISIT (OUTPATIENT)
Dept: INTERNAL MEDICINE | Facility: CLINIC | Age: 79
End: 2024-02-06
Payer: COMMERCIAL

## 2024-02-06 ENCOUNTER — OFFICE VISIT (OUTPATIENT)
Dept: ORTHOPEDICS | Facility: CLINIC | Age: 79
End: 2024-02-06
Payer: COMMERCIAL

## 2024-02-06 VITALS
BODY MASS INDEX: 29.31 KG/M2 | DIASTOLIC BLOOD PRESSURE: 98 MMHG | WEIGHT: 221.13 LBS | WEIGHT: 221.13 LBS | OXYGEN SATURATION: 98 % | HEART RATE: 88 BPM | HEIGHT: 73 IN | RESPIRATION RATE: 18 BRPM | TEMPERATURE: 98 F | HEIGHT: 73 IN | BODY MASS INDEX: 29.31 KG/M2 | SYSTOLIC BLOOD PRESSURE: 170 MMHG

## 2024-02-06 DIAGNOSIS — D51.0 PERNICIOUS ANEMIA: ICD-10-CM

## 2024-02-06 DIAGNOSIS — G70.00 MYASTHENIA GRAVIS: ICD-10-CM

## 2024-02-06 DIAGNOSIS — D70.9 NEUTROPENIA, UNSPECIFIED TYPE: ICD-10-CM

## 2024-02-06 DIAGNOSIS — M17.0 BILATERAL PRIMARY OSTEOARTHRITIS OF KNEE: Primary | ICD-10-CM

## 2024-02-06 DIAGNOSIS — M25.462 EFFUSION OF LEFT KNEE: ICD-10-CM

## 2024-02-06 DIAGNOSIS — M17.0 BILATERAL PRIMARY OSTEOARTHRITIS OF KNEE: ICD-10-CM

## 2024-02-06 DIAGNOSIS — I10 PRIMARY HYPERTENSION: Primary | ICD-10-CM

## 2024-02-06 PROCEDURE — 99204 OFFICE O/P NEW MOD 45 MIN: CPT | Mod: 25,S$GLB,, | Performed by: STUDENT IN AN ORGANIZED HEALTH CARE EDUCATION/TRAINING PROGRAM

## 2024-02-06 PROCEDURE — 1160F RVW MEDS BY RX/DR IN RCRD: CPT | Mod: CPTII,S$GLB,, | Performed by: STUDENT IN AN ORGANIZED HEALTH CARE EDUCATION/TRAINING PROGRAM

## 2024-02-06 PROCEDURE — 1159F MED LIST DOCD IN RCRD: CPT | Mod: CPTII,S$GLB,, | Performed by: STUDENT IN AN ORGANIZED HEALTH CARE EDUCATION/TRAINING PROGRAM

## 2024-02-06 PROCEDURE — 1126F AMNT PAIN NOTED NONE PRSNT: CPT | Mod: CPTII,S$GLB,, | Performed by: STUDENT IN AN ORGANIZED HEALTH CARE EDUCATION/TRAINING PROGRAM

## 2024-02-06 PROCEDURE — 1101F PT FALLS ASSESS-DOCD LE1/YR: CPT | Mod: CPTII,S$GLB,, | Performed by: STUDENT IN AN ORGANIZED HEALTH CARE EDUCATION/TRAINING PROGRAM

## 2024-02-06 PROCEDURE — 3080F DIAST BP >= 90 MM HG: CPT | Mod: CPTII,S$GLB,, | Performed by: STUDENT IN AN ORGANIZED HEALTH CARE EDUCATION/TRAINING PROGRAM

## 2024-02-06 PROCEDURE — 99999 PR PBB SHADOW E&M-EST. PATIENT-LVL IV: CPT | Mod: PBBFAC,,, | Performed by: STUDENT IN AN ORGANIZED HEALTH CARE EDUCATION/TRAINING PROGRAM

## 2024-02-06 PROCEDURE — 20611 DRAIN/INJ JOINT/BURSA W/US: CPT | Mod: LT,S$GLB,, | Performed by: STUDENT IN AN ORGANIZED HEALTH CARE EDUCATION/TRAINING PROGRAM

## 2024-02-06 PROCEDURE — 99214 OFFICE O/P EST MOD 30 MIN: CPT | Mod: S$GLB,,, | Performed by: STUDENT IN AN ORGANIZED HEALTH CARE EDUCATION/TRAINING PROGRAM

## 2024-02-06 PROCEDURE — 3077F SYST BP >= 140 MM HG: CPT | Mod: CPTII,S$GLB,, | Performed by: STUDENT IN AN ORGANIZED HEALTH CARE EDUCATION/TRAINING PROGRAM

## 2024-02-06 PROCEDURE — 3288F FALL RISK ASSESSMENT DOCD: CPT | Mod: CPTII,S$GLB,, | Performed by: STUDENT IN AN ORGANIZED HEALTH CARE EDUCATION/TRAINING PROGRAM

## 2024-02-06 RX ORDER — TRIAMCINOLONE ACETONIDE 40 MG/ML
40 INJECTION, SUSPENSION INTRA-ARTICULAR; INTRAMUSCULAR
Status: DISCONTINUED | OUTPATIENT
Start: 2024-02-06 | End: 2024-02-06 | Stop reason: HOSPADM

## 2024-02-06 RX ORDER — LISINOPRIL AND HYDROCHLOROTHIAZIDE 12.5; 2 MG/1; MG/1
1 TABLET ORAL DAILY
Qty: 90 TABLET | Refills: 3 | Status: SHIPPED | OUTPATIENT
Start: 2024-02-06 | End: 2025-02-05

## 2024-02-06 RX ADMIN — TRIAMCINOLONE ACETONIDE 40 MG: 40 INJECTION, SUSPENSION INTRA-ARTICULAR; INTRAMUSCULAR at 10:02

## 2024-02-06 NOTE — PROCEDURES
Sports Medicine US - Guidance for Needle Placement    Date/Time: 2/6/2024 10:40 AM    Performed by: Darrel Goldman MD  Authorized by: Darrel Goldman MD  Preparation: Patient was prepped and draped in the usual sterile fashion.  Local anesthesia used: no    Anesthesia:  Local anesthesia used: no    Sedation:  Patient sedated: no    Patient tolerance: patient tolerated the procedure well with no immediate complications  Comments: Ultrasound guidance was used for needle localization. Images were saved and stored for documentation. The appropriate structures were visualized. Dynamic visualization of the needle was continuous throughout the procedures and maintained good position.          Nephrology

## 2024-02-06 NOTE — PATIENT INSTRUCTIONS
Assessment:  Kobi Covington is a 79 y.o. male No chief complaint on file.      Encounter Diagnosis   Name Primary?    Bilateral primary osteoarthritis of knee Yes        Plan:  Ultrasound guided cortisone injection to the left knee  We discussed the proper protocols after the injection such as no submerging pools, baths tubs, or hot tubs for 24 hr.  Showering is okay today.  We also discussed that blood sugars can be elevated after an injection and asked patient to properly checked her sugars over the next few days and contact their PCP if there are any concerns.  We discussed red flags such as fevers, chills, red, warm, tender joint at the area of injection to please seek medical care immediately.    Apply topical diclofenac (Voltaren) up to 4 times a day to the affected area.  It can be bought over the counter at any local pharmacy.    Patient may ice every 2 hours for 15 minutes as needed to control pain and swelling.   Patient may use over the counter lidocaine patches as needed for pain.  Please take Tylenol 1 Gram (2 extra-strength Tylenol tablets) up to 3 times a day.  Please to not take any extra doses of tylenol if you are scheduling in this manner.   Follow up as needed        General Arthritis info:    -shiny white stuff at end of a chicken bones is cartilage    -arthritis is wearing away of the cartilage that lines the end of your bones    -osteoarthritis is thought to be a wear and tear phenomenon    -symptoms are due to inflammation of joint causing stiffness, aching, and sometimes swelling    -occasionally sharp pain will occur causing a give way sensation    -Risk factors: genetic, weight, female > male, age    Treatment options:    -maintain healthy weight (every pound is 4 pounds of pressure on the knee)    -daily moderate exercise (walk, bike, swim 30 minutes per day) to keep joints moving    -daily strengthening exercises (through therapy or on own) to keep muscles supporting joint healthy and  strong    -glucosamine 1500mg daily (look for USP label on bottle)    -tylenol as needed for pain (follow directions on the bottle)    -anti-inflammatory medication such as alleve may be helpful- take 1-2 tabs twice daily for 7 days. If it helps your pain, continue. If you do not feel any change, you may stop and then take it as needed.    (you may be given a once daily anti-inflammatory such as MOBIC. If given, avoid other anti-inflammatory medications such as advil, ibuprofen, motrin, naprosyn, alleve, etc)    -if swollen and painful, ice, decrease activity, and take anti-inflammatory daily for 5-7 days and if no relief call your doctor for further options    -consider cortisone injection (every 3-4 months at most)- anti- inflammatory steroid medication that can be injected directly into the joint to reduce inflammation    -consider hyaluronic acid injections (eufflexxa, hyalgan, synvisc, supartz) (every 6 months at most)- protein injection that helps decrease pain and irritability in the joint. It is best used to help prolong intervals between cortisone injections to minimize steroid injections. These are currently approved for knee injections. Discuss with your doctor if other joint involved. Call to seek approval prior to the injections.    -long-term treatment may include a total joint replacement (keep diary of good days and bad days, then evaluate as to when you are ready)      Follow-up: as needed.    Thank you for choosing [facility] and Dr. Darrel Goldman for your orthopedic & sports medicine care. It is our goal to provide you with exceptional care that will help keep you healthy, active, and get you back in the game.    Please do not hesitate to reach out to us via email, phone, or [email or web address] with any questions, concerns, or feedback.    If you felt that you received exemplary care today, please consider leaving us feedback on [employer]  at:  [email or web address]    If you are experiencing pain/discomfort ,or have questions after 5pm and would like to be connected to the [facility] on-call team, please call this number and specify which Sports Medicine provider is treating you: [phone]

## 2024-02-06 NOTE — PROGRESS NOTES
Chief Complaint   Patient presents with    Follow-up    Hypertension     HPI: Kobi Covington is a 79 y.o. male  with Pmhx listed below who presents to clinic for evaluation of blood pressure. I saw him  on 01/22/2024 during which his blood pressure was found to be elevated.  He was asked to keep bp log and was told to bring it back in 2 weeks visit. Review of the Bp log reveals that his blood pressure is not well controlled. He denies having chest pain, headache, dizziness, palpitation, syncopal episode, blurry vision, leg swelling and other complains at present. No other issues and concerns today.          Problem List:  Patient Active Problem List   Diagnosis    Forearm laceration, right, initial encounter    Myasthenia gravis    Hyperlipidemia    Pernicious anemia    Neutropenia, unspecified type     ROS: Negative except as noted above.     Current Meds:  Current Outpatient Medications   Medication Sig Dispense Refill    cyanocobalamin 1,000 mcg/mL injection Inject 1 mL (1,000 mcg total) into the muscle every 30 days. 3 mL 0    predniSONE (DELTASONE) 10 MG tablet Take 10 mg by mouth once daily.      lisinopriL-hydrochlorothiazide (PRINZIDE,ZESTORETIC) 20-12.5 mg per tablet Take 1 tablet by mouth once daily. 90 tablet 3     No current facility-administered medications for this visit.      PE:  BP: (!) 170/98  Pulse: 88 Resp: 18   Temp: 98.2 °F (36.8 °C)  Weight: 100.3 kg (221 lb 1.9 oz) Body mass index is 29.17 kg/m².    Wt Readings from Last 5 Encounters:   02/06/24 100.3 kg (221 lb 1.9 oz)   01/22/24 104.4 kg (230 lb 2.6 oz)   02/16/23 105.7 kg (233 lb 0.4 oz)   10/20/22 97.5 kg (214 lb 15.2 oz)   02/04/22 102.7 kg (226 lb 6.6 oz)     General appearance: alert and cooperative, not in acute distress  Head: normocephalic, without obvious abnormality, atraumatic  Eyes: conjunctivae/corneas clear. PERRL, EOM's intact.  Ears: clear tympanic membranes   Neck: no adenopathy, supple, symmetrical, trachea midline and  thyroid not enlarged, symmetric, no tenderness/mass/nodules, no JVD  Throat: lips, mucosa, and tongue normal; teeth and gums normal; no thrush  Chest: no reproducible chest pain   Heart: regular rate and rhythm, S1, S2 normal, no murmur, click, rub or gallop  Lungs: unlabored respiration, bilateral equal air entry, normal vesicular breath sound heard, no wheezing, rhonchi   Abdomen: soft, non-tender, non-distended; bowel sounds +; no masses,  no organomegaly, no ascites   Extremities: normal, atraumatic, no cyanosis or edema noted B/L upper and lower extremities.  Skin: skin color, texture, turgor normal. No rashes or lesions noted.  Neurologic: grossly intact        Lab:  Lab Results   Component Value Date    WBC 3.83 (L) 01/23/2024    HGB 11.7 (L) 01/23/2024    HCT 36.0 (L) 01/23/2024    MCV 95 01/23/2024     01/23/2024     01/23/2024    K 3.8 01/23/2024     01/23/2024    CO2 24 01/23/2024    BUN 12 01/23/2024     01/23/2024    CALCIUM 8.5 (L) 01/23/2024    AST 32 01/23/2024    ALT 24 01/23/2024    CHOL 204 (H) 01/23/2024    HDL 58 01/23/2024    LDLCALC 128.8 01/23/2024    TRIG 86 01/23/2024    TSH 1.220 11/20/2023    INR 1.0 04/02/2015       Impression:    ICD-10-CM ICD-9-CM    1. Primary hypertension  I10 401.9 lisinopriL-hydrochlorothiazide (PRINZIDE,ZESTORETIC) 20-12.5 mg per tablet      2. Neutropenia, unspecified type  D70.9 288.00       1. Primary hypertension  Low salt diet.  Enouraged to increase in physical activity at least 30 minutes of brisk walking/day , 5 days a week  Advised weight loss    Advised for DASH diet - The Dietary Approaches to Stop Hypertension (DASH) is high in vegetables, fruits, low-fat dairy products, whole grains, poultry, fish, and nuts and low in sweets, sugar-sweetened beverages, and red meats   Stop smoking.  Limit caffeine intake  Limit alcohol intake <3/day for men and <2/day for women.  Advised to be compliant with medication.  Please monitor your  blood pressure regularly at home   Return precaution provided  Blood pressure elevated  Has H/o myasthenia gravis, so cannot take beta blockers and calcium channel blockers.  Starting him on Ace-I with hctz  Bp log to be maintained  - lisinopriL-hydrochlorothiazide (PRINZIDE,ZESTORETIC) 20-12.5 mg per tablet; Take 1 tablet by mouth once daily.  Dispense: 90 tablet; Refill: 3    2. Neutropenia, unspecified type   Latest Reference Range & Units 01/23/24 07:36   WBC 3.90 - 12.70 K/uL 3.83 (L)   RBC 4.60 - 6.20 M/uL 3.79 (L)   Hemoglobin 14.0 - 18.0 g/dL 11.7 (L)   Hematocrit 40.0 - 54.0 % 36.0 (L)   MCV 82 - 98 fL 95   MCH 27.0 - 31.0 pg 30.9   MCHC 32.0 - 36.0 g/dL 32.5   RDW 11.5 - 14.5 % 11.7   Platelet Count 150 - 450 K/uL 168   MPV 9.2 - 12.9 fL 9.3   Gran % 38.0 - 73.0 % 43.0   Lymph % 18.0 - 48.0 % 31.6   Mono % 4.0 - 15.0 % 22.5 (H)   Eos % 0.0 - 8.0 % 1.8   Basophil % 0.0 - 1.9 % 0.8   Immature Granulocytes 0.0 - 0.5 % 0.3   Gran # (ANC) 1.8 - 7.7 K/uL 1.7 (L)   Lymph # 1.0 - 4.8 K/uL 1.2   Mono # 0.3 - 1.0 K/uL 0.9   Eos # 0.0 - 0.5 K/uL 0.1   Baso # 0.00 - 0.20 K/uL 0.03   Immature Grans (Abs) 0.00 - 0.04 K/uL 0.01   nRBC 0 /100 WBC 0   Differential Method  Automated   (L): Data is abnormally low  (H): Data is abnormally high  ANC is 1600   No fever, dysphagia, nausea, vomiting diarrhea  Monitor fro now    3. Bilateral primary osteoarthritis of knee  Xray knees bilaterally: Bilateral multi compartment degenerative findings present most prevalent within the lateral compartments including at least moderate joint space loss.  No acute osseous abnormality.  No large joint effusion.   has appointment with orthopedics today    4. Myasthenia gravis  Stable on prednisone    5. Pernicious anemia   Latest Reference Range & Units Most Recent 11/20/23 14:42   Vitamin B12 232 - 1,245 pg/mL 158 (L) (E)  11/20/23 14:42 158 (L) (E)   (L): Data is abnormally low  (E): External lab result  Getting monthly v12  injection      Future Appointments   Date Time Provider Department Center   2/6/2024 10:40 AM Darrel Goldman MD [facility] [place]   2/20/2024  9:20 AM Rashi Barbosa MD [facility] [place]     Rtc in 2 weeks with bp log.    Rashi Barbosa MD

## 2024-02-06 NOTE — PROGRESS NOTES
Patient ID: Kobi Covington  YOB: 1945  MRN: [id number]    Chief Complaint: bilateral knee pain      Referred By: Dr. Barbosa    History of Present Illness: Kobi Covington is a right-hand dominant 79 y.o. male who presents today with bilateral knee pain.  As per the patient, he has been having bilateral knee pain for last several years. He reports it to be in the front of knee, worse with movement. Occasional popping,cracking, and giving out. He is here to discuss CSI.    The patient is active in none.  Occupation: supervisor      Past Medical History:   Past Medical History:   Diagnosis Date    Arthritis     Myasthenia gravis      Past Surgical History:   Procedure Laterality Date    ROTATOR CUFF REPAIR Left      History reviewed. No pertinent family history.  Social History     Socioeconomic History    Marital status:    Tobacco Use    Smoking status: Former    Smokeless tobacco: Never   Substance and Sexual Activity    Alcohol use: Yes     Comment: occasional beer    Drug use: No     Medication List with Changes/Refills   Current Medications    CYANOCOBALAMIN 1,000 MCG/ML INJECTION    Inject 1 mL (1,000 mcg total) into the muscle every 30 days.    LISINOPRIL-HYDROCHLOROTHIAZIDE (PRINZIDE,ZESTORETIC) 20-12.5 MG PER TABLET    Take 1 tablet by mouth once daily.    PREDNISONE (DELTASONE) 10 MG TABLET    Take 10 mg by mouth once daily.     Review of patient's allergies indicates:  No Known Allergies    Physical Exam:   Body mass index is 29.17 kg/m².    GENERAL: Well appearing, in no acute distress.  HEAD: Normocephalic and atraumatic.  ENT: External ears and nose grossly normal.  EYES: EOMI bilaterally  PULMONARY: Respirations are grossly even and non-labored.  NEURO: Awake, alert, and oriented x 3.  SKIN: No obvious rashes appreciated.  PSYCH: Mood & affect are appropriate.    Detailed MSK exam:     Left knee exam:   -ROM: extension +5, flexion 120  -TTP: Medial joint line  -effusion:  moderate  -Patellar apprehension negative  -Sophie test negative  -stable to varus and valgus stress tests  -Lachman test negative, anterior drawer test negative, posterior drawer test negative    Right knee exam:   -ROM: extension +5, flexion 130  -TTP: Medial joint line and Lateral joint line  -effusion: trace  -Patellar apprehension negative  -Sophie test negative  -stable to varus and valgus stress tests  -Lachman test negative, anterior drawer test negative, posterior drawer test negative  -large open wound with granulation tissue over anterior proximal tibia      Imaging:  X-Ray Cervical Spine 2 or 3 Views  Narrative: EXAMINATION:  XR CERVICAL SPINE 2 OR 3 VIEWS    CLINICAL HISTORY:  Radiculopathy, cervical region    TECHNIQUE:  AP, lateral and open mouth views of the cervical spine were performed.    COMPARISON:  None.    FINDINGS:  Vertebral body heights maintained.  2-3 mm anterolisthesis C4 on C5.  Degenerative disc height loss and osteophyte changes C5-C6.  Multilevel facet arthropathy.  Lung apices and prevertebral soft tissues unremarkable.  No acute abnormality.  Impression: Degenerative findings    Electronically signed by: Huang Byrd MD  Date:    01/23/2024  Time:    08:14  X-Ray Knee Complete 4 Or More Views Bilat  Narrative: EXAMINATION:  XR KNEE COMP 4 OR MORE VIEWS BILAT    CLINICAL HISTORY:  Bilateral primary osteoarthritis of knee    TECHNIQUE:  AP/lateral/sunrise views    COMPARISON:  None    FINDINGS:  Bilateral multi compartment degenerative findings present most prevalent within the lateral compartments including at least moderate joint space loss.  No acute osseous abnormality.  No large joint effusion.  Impression: Degenerative findings    Electronically signed by: Huang Byrd MD  Date:    01/23/2024  Time:    08:13        Relevant imaging results were reviewed and interpreted by me and per my read shows moderate arthritic changes bilaterally.  This was discussed with the  patient and / or family today.     Assessment:  Kobi Covington is a 79 y.o. male presenting with chronic bilateral knee pain.   History, physical and radiographs are consistent with a likely diagnosis of OA, large abrasion right anterior knee/tibia.   Plan: Steroid injection given today (see separate procedure note for details). We discussed the proper protocols after the injection such as no submerging pools, baths tubs, or hot tubs for 24 hr.  Showering is okay today.  We also discussed that blood sugars can be elevated after an injection and asked patient to properly checked her sugars over the next few days and contact their PCP if there are any concerns.  We discussed red flags such as fevers, chills, red, warm, tender joint at the area of injection to please seek medical care immediately.   43 cc aspirated today without difficulty. Will hold off on right knee steroid injection today d/t healing open wound. Consider right knee steroid injection after that heals. Consider gel injection if not improving. Continue conservative management for pain.   Follow up as needed. All questions answered.      Bilateral primary osteoarthritis of knee  -     Ambulatory referral/consult to Orthopedics  -     Sports Medicine US - Guidance for Needle Placement  -     Large Joint Aspiration/Injection: L supra patellar bursa    Effusion of left knee  -     Large Joint Aspiration/Injection: L supra patellar bursa         Ultrasound guidance was used for needle localization. Images were saved and stored for documentation. The appropriate structures were visualized. Dynamic visualization of the needle was continuous throughout the procedures and maintained good position.      MEDICAL NECESSITY FOR VISCOSUPPLEMENTATION: After thorough evaluation of the patient, I have determined that visco-supplementation is medically necessary. The patient has painful degenerative changes of the knee with failure of conservative treatments including  lifestyle modifications and rehabilitation exercises.  Oral analgesis/NSAIDs have not adequately controlled symptoms and there is radiographic evidence of Kellgren Sherman grade 2 or greater osteoarthritic changes, or in lack of radiographic evidence, there is arthroscopic or other evidence of chondrosis.     A copy of today's visit note has been sent to the referring provider.     Electronically signed:  Darrel Goldman MD, MPH  02/06/2024  10:02 AM

## 2024-02-06 NOTE — PROCEDURES
Large Joint Aspiration/Injection: L supra patellar bursa    Date/Time: 2/6/2024 10:40 AM    Performed by: Darrel Goldman MD  Authorized by: Darrel Goldman MD    Consent Done?:  Yes (Verbal)  Indications:  Arthritis, pain and joint swelling  Site marked: the procedure site was marked    Timeout: prior to procedure the correct patient, procedure, and site was verified    Prep: patient was prepped and draped in usual sterile fashion    Local anesthetic:  Bupivacaine 0.5% without epinephrine and lidocaine 1% without epinephrine    Details:  Needle Size:  18 G  Ultrasonic Guidance for needle placement?: Yes    Images are saved and documented.  Approach:  Anterolateral  Location:  Knee  Site:  L supra patellar bursa  Medications:  40 mg triamcinolone acetonide 40 mg/mL  Aspirate amount (mL):  43  Aspirate:  Yellow  Patient tolerance:  Patient tolerated the procedure well with no immediate complications     Ultrasound guidance was used for needle localization. Images were saved and stored for documentation. The appropriate structures were visualized. Dynamic visualization of the needle was continuous throughout the procedures and maintained good position.

## 2024-02-20 ENCOUNTER — TELEPHONE (OUTPATIENT)
Dept: PHYSICAL MEDICINE AND REHAB | Facility: CLINIC | Age: 79
End: 2024-02-20
Payer: COMMERCIAL

## 2024-02-20 ENCOUNTER — OFFICE VISIT (OUTPATIENT)
Dept: INTERNAL MEDICINE | Facility: CLINIC | Age: 79
End: 2024-02-20
Payer: COMMERCIAL

## 2024-02-20 VITALS
OXYGEN SATURATION: 95 % | SYSTOLIC BLOOD PRESSURE: 120 MMHG | TEMPERATURE: 98 F | BODY MASS INDEX: 28.98 KG/M2 | WEIGHT: 218.69 LBS | DIASTOLIC BLOOD PRESSURE: 70 MMHG | HEIGHT: 73 IN | HEART RATE: 81 BPM

## 2024-02-20 DIAGNOSIS — I10 PRIMARY HYPERTENSION: Primary | ICD-10-CM

## 2024-02-20 DIAGNOSIS — D51.0 PERNICIOUS ANEMIA: ICD-10-CM

## 2024-02-20 DIAGNOSIS — G70.00 MYASTHENIA GRAVIS: ICD-10-CM

## 2024-02-20 PROCEDURE — 3288F FALL RISK ASSESSMENT DOCD: CPT | Mod: CPTII,S$GLB,, | Performed by: STUDENT IN AN ORGANIZED HEALTH CARE EDUCATION/TRAINING PROGRAM

## 2024-02-20 PROCEDURE — 1101F PT FALLS ASSESS-DOCD LE1/YR: CPT | Mod: CPTII,S$GLB,, | Performed by: STUDENT IN AN ORGANIZED HEALTH CARE EDUCATION/TRAINING PROGRAM

## 2024-02-20 PROCEDURE — 99999 PR PBB SHADOW E&M-EST. PATIENT-LVL III: CPT | Mod: PBBFAC,,, | Performed by: STUDENT IN AN ORGANIZED HEALTH CARE EDUCATION/TRAINING PROGRAM

## 2024-02-20 PROCEDURE — 1126F AMNT PAIN NOTED NONE PRSNT: CPT | Mod: CPTII,S$GLB,, | Performed by: STUDENT IN AN ORGANIZED HEALTH CARE EDUCATION/TRAINING PROGRAM

## 2024-02-20 PROCEDURE — 3074F SYST BP LT 130 MM HG: CPT | Mod: CPTII,S$GLB,, | Performed by: STUDENT IN AN ORGANIZED HEALTH CARE EDUCATION/TRAINING PROGRAM

## 2024-02-20 PROCEDURE — 99214 OFFICE O/P EST MOD 30 MIN: CPT | Mod: S$GLB,,, | Performed by: STUDENT IN AN ORGANIZED HEALTH CARE EDUCATION/TRAINING PROGRAM

## 2024-02-20 PROCEDURE — 3078F DIAST BP <80 MM HG: CPT | Mod: CPTII,S$GLB,, | Performed by: STUDENT IN AN ORGANIZED HEALTH CARE EDUCATION/TRAINING PROGRAM

## 2024-02-20 PROCEDURE — 1159F MED LIST DOCD IN RCRD: CPT | Mod: CPTII,S$GLB,, | Performed by: STUDENT IN AN ORGANIZED HEALTH CARE EDUCATION/TRAINING PROGRAM

## 2024-02-20 NOTE — TELEPHONE ENCOUNTER
----- Message from Sri Blake LPN sent at 2/20/2024 10:06 AM CST -----  Hey friend!! Pt had visit today and I reminded him that he never had never had nerve test done. He said that he has spam on phone and had not seen but says that he will be watching phone today and should be able to catch your call. Just wanted to let you know.

## 2024-02-20 NOTE — PROGRESS NOTES
Chief Complaint   Patient presents with    Hypertension     HPI: Kobi Covington is a 79 y.o. male  with Pmhx listed below who presents to clinic for evaluation of blood pressure. I saw him  on 02/06/2024 during which his blood pressure was found to be elevated. His medication was adjusted and prinzide was added to his regimen. He was asked to keep bp log and was told to bring it back in 2 weeks visit. Review of the Bp log reveals that his blood pressure is well controlled. He denies having chest pain, headache, dizziness, palpitation, syncopal episode, blurry vision, leg swelling and other complains at present. No other issues and concerns today.    Problem List:  Patient Active Problem List   Diagnosis    Forearm laceration, right, initial encounter    Myasthenia gravis    Hyperlipidemia    Pernicious anemia    Neutropenia, unspecified type     ROS: Negative except as noted above.     Current Meds:  Current Outpatient Medications   Medication Sig Dispense Refill    cyanocobalamin 1,000 mcg/mL injection Inject 1 mL (1,000 mcg total) into the muscle every 30 days. 3 mL 0    lisinopriL-hydrochlorothiazide (PRINZIDE,ZESTORETIC) 20-12.5 mg per tablet Take 1 tablet by mouth once daily. 90 tablet 3    predniSONE (DELTASONE) 10 MG tablet Take 10 mg by mouth once daily.       No current facility-administered medications for this visit.      PE:                There is no height or weight on file to calculate BMI.    Wt Readings from Last 5 Encounters:   02/06/24 100.3 kg (221 lb 1.9 oz)   02/06/24 100.3 kg (221 lb 1.9 oz)   01/22/24 104.4 kg (230 lb 2.6 oz)   02/16/23 105.7 kg (233 lb 0.4 oz)   10/20/22 97.5 kg (214 lb 15.2 oz)     General appearance: alert and cooperative, not in acute distress  Head: normocephalic, without obvious abnormality, atraumatic  Eyes: conjunctivae/corneas clear. PERRL, EOM's intact.  Ears: clear tympanic membranes   Neck: no adenopathy, supple, symmetrical, trachea midline and thyroid not  enlarged, symmetric, no tenderness/mass/nodules, no JVD  Throat: lips, mucosa, and tongue normal; teeth and gums normal; no thrush  Chest: no reproducible chest pain   Heart: regular rate and rhythm, S1, S2 normal, no murmur, click, rub or gallop  Lungs: unlabored respiration, bilateral equal air entry, normal vesicular breath sound heard, no wheezing, rhonchi   Abdomen: soft, non-tender, non-distended; bowel sounds +; no masses,  no organomegaly, no ascites   Extremities: normal, atraumatic, no cyanosis or edema noted B/L upper and lower extremities.  Skin: skin color, texture, turgor normal. No rashes or lesions noted.  Neurologic: grossly intact        Lab:  Lab Results   Component Value Date    WBC 3.83 (L) 01/23/2024    HGB 11.7 (L) 01/23/2024    HCT 36.0 (L) 01/23/2024    MCV 95 01/23/2024     01/23/2024     01/23/2024    K 3.8 01/23/2024     01/23/2024    CO2 24 01/23/2024    BUN 12 01/23/2024     01/23/2024    CALCIUM 8.5 (L) 01/23/2024    AST 32 01/23/2024    ALT 24 01/23/2024    CHOL 204 (H) 01/23/2024    HDL 58 01/23/2024    LDLCALC 128.8 01/23/2024    TRIG 86 01/23/2024    TSH 1.220 11/20/2023    INR 1.0 04/02/2015       Impression:  1. Primary hypertension  Low salt diet.  Enouraged to increase in physical activity at least 30 minutes of brisk walking/day , 5 days a week  Advised weight loss    Advised for DASH diet - The Dietary Approaches to Stop Hypertension (DASH) is high in vegetables, fruits, low-fat dairy products, whole grains, poultry, fish, and nuts and low in sweets, sugar-sweetened beverages, and red meats   Stop smoking.  Limit caffeine intake  Limit alcohol intake <3/day for men and <2/day for women.  Advised to be compliant with medication.  Please monitor your blood pressure regularly at home   Return precaution provided  Blood pressure elevated  Has H/o myasthenia gravis, so cannot take beta blockers and calcium channel blockers.  Starting him on Ace-I with  hctz  Bp log to be maintained  On prinzide to be continued.    2. Myasthenia gravis  Stable on prednisone     3. Pernicious anemia    Latest Reference Range & Units Most Recent 11/20/23 14:42    Vitamin B12 232 - 1,245 pg/mL 158 (L) (E)  11/20/23 14:42 158 (L) (E)   (L): Data is abnormally low  (E): External lab result  Getting monthly v12 injection      Rtc in 6 months.    Rashi Barbosa MD

## 2024-03-01 ENCOUNTER — OFFICE VISIT (OUTPATIENT)
Dept: PHYSICAL MEDICINE AND REHAB | Facility: CLINIC | Age: 79
End: 2024-03-01
Payer: COMMERCIAL

## 2024-03-01 VITALS
BODY MASS INDEX: 28.89 KG/M2 | RESPIRATION RATE: 14 BRPM | SYSTOLIC BLOOD PRESSURE: 148 MMHG | WEIGHT: 218 LBS | HEIGHT: 73 IN | HEART RATE: 75 BPM | DIASTOLIC BLOOD PRESSURE: 79 MMHG

## 2024-03-01 DIAGNOSIS — G56.03 BILATERAL CARPAL TUNNEL SYNDROME: Primary | ICD-10-CM

## 2024-03-01 DIAGNOSIS — M54.12 CERVICAL RADICULOPATHY: ICD-10-CM

## 2024-03-01 PROCEDURE — 95886 MUSC TEST DONE W/N TEST COMP: CPT | Mod: S$GLB,,, | Performed by: PHYSICAL MEDICINE & REHABILITATION

## 2024-03-01 PROCEDURE — 99499 UNLISTED E&M SERVICE: CPT | Mod: S$GLB,,, | Performed by: PHYSICAL MEDICINE & REHABILITATION

## 2024-03-01 PROCEDURE — 95911 NRV CNDJ TEST 9-10 STUDIES: CPT | Mod: S$GLB,,, | Performed by: PHYSICAL MEDICINE & REHABILITATION

## 2024-03-01 PROCEDURE — 99999 PR PBB SHADOW E&M-EST. PATIENT-LVL IV: CPT | Mod: PBBFAC,,, | Performed by: PHYSICAL MEDICINE & REHABILITATION

## 2024-03-01 NOTE — PROGRESS NOTES
[facility]   [address] [place] [address]  Phone: [phone]        Full Name: Kobi Covington YOB: 1945  Patient ID: [id number]      Visit Date: 3/1/2024 08:44  Age: 79 Years 1 Months Old  Examining Physician: Monica Stewart M.D.  Referring Physician:   Reason for Referral: ue pain        Chief Complaint   Patient presents with    Numbness       HPI: This is a 79 y.o.  male being seen in clinic today for evaluation of chronic right hand numbness/tingling with limited . His symptoms bother him at night when trying to rest and with increased hand usage. Position change provides some relief.    History obtained from patient    Past family, medical, social, and surgical history reviewed in chart    Review of Systems:     General- denies lethargy, weight change, fever, chills  Head/neck- denies swallowing difficulties  ENT- denies hearing changes  Cardiovascular-denies chest pain  Pulmonary- denies shortness of breath  GI- denies constipation or bowel incontinence  - denies bladder incontinence  Skin- denies wounds or rashes  Musculoskeletal- + weakness, + pain  Neurologic- + numbness and tingling  Psychiatric- denies depressive or psychotic features, denies anxiety  Lymphatic-denies swelling  Endocrine- denies hypoglycemic symptoms/DM history  All other pertinent systems negative     Physical Examination:  General: Well developed, well nourished male, NAD  HEENT:NCAT EOMI bilaterally   Pulmonary:Normal respirations    Spinal Examination: CERVICAL  Active ROM is within normal limits.  Inspection: No deformity of spinal alignment.      Musculoskeletal Tests:  Phalen: neg  Elbow compression (ulnar): neg  Tinels at wrist: neg    Bilateral Upper and Lower Extremities:  Pulses are 2+ at radial bilaterally.  Shoulder/Elbow/Wrist/Hand ROM wnl  Hip/Knee/Ankle ROM   Bilateral Extremities show normal capillary refill.  No signs of cyanosis, rubor, edema, skin changes,  or dysvascular changes of appendages.  Nails appear intact.    Neurological Exam:  Cranial Nerves:  II-XII grossly intact    Manual Muscle Testing: (Motor 5=normal)  5/5 strength bilateral upper extremities except 3+/5 right  and APB    No focal atrophy is noted of either upper extremity.    Bilateral Reflexes:  Mckeon's response is absent bilaterally.    Sensation: tested to light touch  - intact in arms except dec at right fingertips    Gait: Narrow base and good arm swing.      Entire procedure explained to patient prior to proceeding.  Verbal consent obtained        SNC      Nerve / Sites Rec. Site Onset Lat Peak Lat Amp Segments Distance Velocity     ms ms µV  mm m/s   R Median - Digit II (Antidromic)      Wrist Dig II NR NR NR Wrist - Dig  NR   L Median - Digit II (Antidromic)      Wrist Dig II 4.3 5.4 12.3 Wrist - Dig  32   R Ulnar - Digit V (Antidromic)      Wrist Dig V 3.2 4.3 9.3 Wrist - Dig V 140 44   L Ulnar - Digit V (Antidromic)      Wrist Dig V 3.3 4.1 9.4 Wrist - Dig V 140 43   R Radial - Anatomical snuff box (Forearm)      Forearm Wrist 1.2 2.5 10.5 Forearm - Wrist 100 83   L Radial - Anatomical snuff box (Forearm)      Forearm Wrist 1.9 2.6 14.0 Forearm - Wrist 100 53       MNC      Nerve / Sites Muscle Latency Amplitude Duration Rel Amp Segments Distance Lat Diff Velocity     ms mV ms %  mm ms m/s   R Median - APB      Wrist APB 6.6 3.0 6.2 100 Wrist - APB 80        Elbow APB 12.7 2.8 6.5 91.4 Elbow - Wrist 270 6.1 44   L Median - APB      Wrist APB 4.2 6.6 6.4 100 Wrist - APB 80        Elbow APB 9.8 6.1 6.9 91.5 Elbow - Wrist 280 5.7 49   R Ulnar - ADM      Wrist ADM 2.7 6.0 7.3 100 Wrist -         B. Elbow ADM 8.3 6.5 6.9 108 B. Elbow - Wrist 290 5.6 52      A. Elbow ADM 10.5 6.3 6.9 96.5 A. Elbow - B. Elbow 110 2.2 50   L Ulnar - ADM      Wrist ADM 3.4 7.1 5.5 100 Wrist -         B. Elbow ADM 8.8 6.6 5.9 93.1 B. Elbow - Wrist 290 5.3 55      A. Elbow ADM 10.9 6.1  6.4 93.1 A. Elbow - B. Elbow 120 2.1 56       EMG         EMG Summary Table     Spontaneous MUAP Recruitment   Muscle IA Fib PSW Fasc Other Dur. Dur Amp Dur Polys Pattern Effort   R. First dorsal interosseous N None None None . _NFT_ _NFT_ N N 1+ sl red Max   R. Abductor pollicis brevis Incr None None None . _NFT_ _NFT_ N Sl Incr 1+ sl red Max   R. Pronator teres N None None None . _NFT_ _NFT_ N N N N Max   R. Brachioradialis N None None None . _NFT_ _NFT_ N N 1+ sl red Max   R. Deltoid N None None None . _NFT_ _NFT_ N N 1+ sl. red Max   L. First dorsal interosseous Incr 1+ 1+ None . _NFT_ _NFT_ N N 1+ Reduced Max   L. Brachioradialis N None None None . _NFT_ _NFT_ N N 1+ sl red Max   L. Triceps brachii N None None None . _NFT_ _NFT_ N N N N Max                                          INTERPRETATION  -Bilateral median motor nerve conduction study showed prolonged latency on the right, dec amplitude on the right, and dec conduction velocity  -Left median sensory nerve conduction study showed prolonged peak latency and normal amplitude  -right median sensory showed no response  -Bilateral ulnar motor nerve conduction study showed normal latency, amplitude, and conduction velocity  -Bilateral ulnar sensory nerve conduction study showed normal peak latency and amplitude  -Bilateral radial sensory nerve conduction study showed normal peak latency and amplitude  -Needle EMG examination performed to above mentioned muscles       IMPRESSION  ABNORMAL study  2. There is electrodiagnostic evidence of a severe demyelinating and axonal median neuropathy (Carpal tunnel syndrome) across the right wrist and a mild demyelinating CTS across the left wrist. There is an acute on chronic radiculopathy of the left C8, T1 nerve roots and a chronic radiculopathy of the right C8,T1 and bilateral C5 nerve roots    PLAN  Discussed in detail for greater than 30 minutes about diagnosis and treatment plan    1. Follow up with referring  provider: Dr. Rashi Barbosa  2. Referral to ortho placed. Handouts on CTS and radic provided. Rec fu with IPM for ESIs  3. This study is good for one year. If symptoms worsen or do not improve, please re-consult.    Monica Stewart M.D.  Physical Medicine and Rehab

## 2024-03-04 ENCOUNTER — TELEPHONE (OUTPATIENT)
Dept: INTERNAL MEDICINE | Facility: CLINIC | Age: 79
End: 2024-03-04
Payer: COMMERCIAL

## 2024-03-04 ENCOUNTER — TELEPHONE (OUTPATIENT)
Dept: SPORTS MEDICINE | Facility: CLINIC | Age: 79
End: 2024-03-04
Payer: COMMERCIAL

## 2024-03-11 DIAGNOSIS — M79.641 BILATERAL HAND PAIN: Primary | ICD-10-CM

## 2024-03-11 DIAGNOSIS — M79.642 BILATERAL HAND PAIN: Primary | ICD-10-CM

## 2024-03-12 ENCOUNTER — HOSPITAL ENCOUNTER (OUTPATIENT)
Dept: RADIOLOGY | Facility: HOSPITAL | Age: 79
Discharge: HOME | End: 2024-03-12
Attending: STUDENT IN AN ORGANIZED HEALTH CARE EDUCATION/TRAINING PROGRAM
Payer: COMMERCIAL

## 2024-03-12 ENCOUNTER — OFFICE VISIT (OUTPATIENT)
Dept: ORTHOPEDICS | Facility: CLINIC | Age: 79
End: 2024-03-12
Payer: COMMERCIAL

## 2024-03-12 VITALS — HEIGHT: 73 IN | WEIGHT: 218.06 LBS | BODY MASS INDEX: 28.9 KG/M2

## 2024-03-12 DIAGNOSIS — M79.641 BILATERAL HAND PAIN: ICD-10-CM

## 2024-03-12 DIAGNOSIS — M79.642 BILATERAL HAND PAIN: ICD-10-CM

## 2024-03-12 DIAGNOSIS — G56.03 BILATERAL CARPAL TUNNEL SYNDROME: ICD-10-CM

## 2024-03-12 PROCEDURE — 1125F AMNT PAIN NOTED PAIN PRSNT: CPT | Mod: CPTII,S$GLB,, | Performed by: STUDENT IN AN ORGANIZED HEALTH CARE EDUCATION/TRAINING PROGRAM

## 2024-03-12 PROCEDURE — 99999 PR PBB SHADOW E&M-EST. PATIENT-LVL III: CPT | Mod: PBBFAC,,, | Performed by: STUDENT IN AN ORGANIZED HEALTH CARE EDUCATION/TRAINING PROGRAM

## 2024-03-12 PROCEDURE — 73130 X-RAY EXAM OF HAND: CPT | Mod: 26,LT,, | Performed by: RADIOLOGY

## 2024-03-12 PROCEDURE — 3288F FALL RISK ASSESSMENT DOCD: CPT | Mod: CPTII,S$GLB,, | Performed by: STUDENT IN AN ORGANIZED HEALTH CARE EDUCATION/TRAINING PROGRAM

## 2024-03-12 PROCEDURE — 73130 X-RAY EXAM OF HAND: CPT | Mod: 26,RT,, | Performed by: RADIOLOGY

## 2024-03-12 PROCEDURE — 99204 OFFICE O/P NEW MOD 45 MIN: CPT | Mod: 25,S$GLB,, | Performed by: STUDENT IN AN ORGANIZED HEALTH CARE EDUCATION/TRAINING PROGRAM

## 2024-03-12 PROCEDURE — 73130 X-RAY EXAM OF HAND: CPT | Mod: TC,50

## 2024-03-12 PROCEDURE — 20526 THER INJECTION CARP TUNNEL: CPT | Mod: 50,S$GLB,, | Performed by: STUDENT IN AN ORGANIZED HEALTH CARE EDUCATION/TRAINING PROGRAM

## 2024-03-12 PROCEDURE — 1159F MED LIST DOCD IN RCRD: CPT | Mod: CPTII,S$GLB,, | Performed by: STUDENT IN AN ORGANIZED HEALTH CARE EDUCATION/TRAINING PROGRAM

## 2024-03-12 PROCEDURE — 1101F PT FALLS ASSESS-DOCD LE1/YR: CPT | Mod: CPTII,S$GLB,, | Performed by: STUDENT IN AN ORGANIZED HEALTH CARE EDUCATION/TRAINING PROGRAM

## 2024-03-12 PROCEDURE — 1160F RVW MEDS BY RX/DR IN RCRD: CPT | Mod: CPTII,S$GLB,, | Performed by: STUDENT IN AN ORGANIZED HEALTH CARE EDUCATION/TRAINING PROGRAM

## 2024-03-12 RX ORDER — TRIAMCINOLONE ACETONIDE 40 MG/ML
40 INJECTION, SUSPENSION INTRA-ARTICULAR; INTRAMUSCULAR
Status: DISCONTINUED | OUTPATIENT
Start: 2024-03-12 | End: 2024-03-12 | Stop reason: HOSPADM

## 2024-03-12 RX ADMIN — TRIAMCINOLONE ACETONIDE 40 MG: 40 INJECTION, SUSPENSION INTRA-ARTICULAR; INTRAMUSCULAR at 09:03

## 2024-03-12 NOTE — PROGRESS NOTES
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  Symptoms on the right are worse than the left.  Symptoms have taken place for a proximally 3 months or so.  Patient experiences 5/10 pain at baseline but 10/10 pain at night and while driving.  Patient regularly wakes up from sleep with numbness in the right-hand.  He has a history of diabetes with a most recent hemoglobin A1c of 6.2 on 11/20/2023.  He recently underwent electrodiagnostic studies on 3/1/24, the results of which are reviewed below.  No history of neck pain.  No history of radiating pain down the arms.  Patient states that over the last few weeks, the numbness has progressed such that he experiences it all of the time in the right-hand in particular.  Patient states that all 5 fingers are numb.  No weakness.  No history of dropping things.    Review of Systems  Review of systems negative for chest pain, shortness of breath, fevers, chills, nausea/vomiting.    Past Medical History  Past Medical History:   Diagnosis Date    Arthritis     Myasthenia gravis        Past Surgical History  Past Surgical History:   Procedure Laterality Date    ROTATOR CUFF REPAIR Left        Allergies  Review of patient's allergies indicates:  No Known Allergies    Family History  No family history on file.    Social History  Social History     Socioeconomic History    Marital status:    Tobacco Use    Smoking status: Former     Types: Cigarettes     Passive exposure: Past    Smokeless tobacco: Never   Substance and Sexual Activity    Alcohol use: Yes     Comment: occasional beer    Drug use: No       Vital Signs  There were no vitals filed for this visit.    Physical Exam  Constitutional: Appears well-developed and well-nourished. No  distress.   HENT:   Head: Normocephalic.   Eyes: EOM are normal.   Pulmonary/Chest: Effort normal.   Neurological: Oriented to person, place, and time.   Psychiatric: Normal mood and affect.     Right Upper Extremity:  No abrasions, lacerations, wounds.  No swelling.  No erythema.  Full active wrist flexion and extension.  Full pronation and supination. Positive Tinel's in the median nerve distribution at the wrist.  Positive Phalen's.  Negative Tinel's in the ulnar nerve distribution at the elbow.  No ulnar nerve subluxation with elbow flexion.  No thenar or FDI atrophy.  5/5 apb strength.  5/5 FDI strength.  Two-point discrimination is 5 mm in all 5 fingers.  Patient is able to make a fist and extend all his fingers.  No tenderness over the A1 pulleys of all 5 fingers.  No triggering with range of motion.  Palpable radial pulse.  Negative Spurling's.  Negative Maureen's.  Normoreflexic biceps, triceps, brachioradialis.    Left Upper Extremity:  No abrasions, lacerations, wounds.  No swelling.  No erythema.  Full active wrist flexion and extension.  Full pronation and supination. Positive Tinel's in the median nerve distribution at the wrist.  Positive Phalen's.  Negative Tinel's in the ulnar nerve distribution at the elbow.  No ulnar nerve subluxation with elbow flexion.  No thenar or FDI atrophy.  5/5 apb strength.  5/5 FDI strength.  Two-point discrimination is 5 mm in all 5 fingers.  Patient is able to make a fist and extend all his fingers.  No tenderness over the A1 pulleys of all 5 fingers.  No triggering with range of motion.  Palpable radial pulse. Negative Spurling's.  Negative Maureen's.  Normoreflexic biceps, triceps, brachioradialis.      Imaging  Bilateral hand x-rays three views were obtained today and independently reviewed by myself.  Patient was noted to have severe arthritic changes at the thumb CMC joints bilaterally with a associated subluxation, joint space loss, and osteophyte formation.   Arthritic changes are also noted at the bilateral thumb MCP and IP joints with associated radial deviation of the thumb distal phalanx.  Arthritic changes are noted at the DIPJ joints of the index, middle, ring, and small fingers bilaterally.  Lastly, arthritic changes are noted at the index and middle finger MCP joints bilaterally.    Electrodiagnostic studies were reviewed by me today that were performed on 3/1/24. The Left median DSL was 4.3 ms and the right showed no response. The Left median DML was 4.2 ms and the right was 6.6 ms. Left Ulnar velocity across elbow was 56 m/s and Right was 50 m/s.  EMG showed 1+ polys and slightly reduced recruitment pattern at right FDI, right APB, right deltoid, right brachioradialis, left FDI and left brachioradialis.  Performing physician interpretation:    Assessment and Plan  79-year-old right-hand dominant male presents with bilateral carpal tunnel syndrome, right worse than left.  The right carpal tunnel syndrome is noted to be severe on electrodiagnostic studies.  I had a long discussion about the natural history of carpal tunnel syndrome with the patient.  I provided him with a handout to read further on the topic.  I discussed that I am concerned he may be progressing to potential permanent nerve damage, particularly in the right hand.  At this point, he does experience numbness during the daytime but it was two-point discrimination is normal and he does not have any evidence of atrophy or weakness.  I discussed potential treatment options including bracing, injections, and surgery.  Patient would like to hold off on surgery for now and try nonoperative treatment options 1st.  I fitted patient for bilateral wrist braces to be worn at night.    At least 10 minutes were spent sizing, fitting, and educating for durable medical equipment application today.  This service was performed under the direction of Judi Barajas MD.  CPT 02395.    I discussed bilateral carpal  tunnel injections and patient agreed to proceed.  Patient tolerated procedure well.  See procedure note.  I discussed that his blood sugar will be elevated following injection.  Follow up in clinic as needed if symptoms recur or do not resolve.      Judi Barajas MD  Orthopaedic Hand Surgery

## 2024-03-12 NOTE — PROCEDURES
Right Carpal Tunnel Injection    Date/Time: 3/12/2024 9:00 AM    Performed by: Judi Barajas MD  Authorized by: Judi Barajas MD    Consent Done?:  Yes (Verbal)  Indications:  Pain and diagnostic evaluation  Timeout: prior to procedure the correct patient, procedure, and site was verified    Local anesthesia used?: Yes    Anesthesia:  Local infiltration  Local anesthetic:  Lidocaine 1% without epinephrine  Anesthetic total (ml):  1    Location:  Wrist  Ultrasonic Guidance for Needle Placement?: No    Needle size:  22 G  Approach:  Volar  Medications:  40 mg triamcinolone acetonide 40 mg/mL  Patient tolerance:  Patient tolerated the procedure well with no immediate complications

## 2024-03-12 NOTE — PROCEDURES
Left Carpal Tunnel Injection    Date/Time: 3/12/2024 9:00 AM    Performed by: Judi Barajas MD  Authorized by: Judi Barajas MD    Consent Done?:  Yes (Verbal)  Indications:  Pain and diagnostic evaluation  Timeout: prior to procedure the correct patient, procedure, and site was verified    Local anesthesia used?: Yes    Anesthesia:  Local infiltration  Local anesthetic:  Lidocaine 1% without epinephrine  Anesthetic total (ml):  1    Location:  Wrist  Ultrasonic Guidance for Needle Placement?: No    Needle size:  22 G  Approach:  Volar  Medications:  40 mg triamcinolone acetonide 40 mg/mL  Patient tolerance:  Patient tolerated the procedure well with no immediate complications

## 2024-05-21 ENCOUNTER — HOSPITAL ENCOUNTER (OUTPATIENT)
Dept: RADIOLOGY | Facility: HOSPITAL | Age: 79
Discharge: HOME | End: 2024-05-21
Attending: STUDENT IN AN ORGANIZED HEALTH CARE EDUCATION/TRAINING PROGRAM
Payer: COMMERCIAL

## 2024-05-21 ENCOUNTER — OFFICE VISIT (OUTPATIENT)
Dept: ORTHOPEDICS | Facility: CLINIC | Age: 79
End: 2024-05-21
Payer: COMMERCIAL

## 2024-05-21 ENCOUNTER — OFFICE VISIT (OUTPATIENT)
Dept: INTERNAL MEDICINE | Facility: CLINIC | Age: 79
End: 2024-05-21
Payer: COMMERCIAL

## 2024-05-21 VITALS
DIASTOLIC BLOOD PRESSURE: 62 MMHG | OXYGEN SATURATION: 97 % | WEIGHT: 212.94 LBS | BODY MASS INDEX: 28.22 KG/M2 | SYSTOLIC BLOOD PRESSURE: 126 MMHG | HEART RATE: 83 BPM | HEIGHT: 73 IN | RESPIRATION RATE: 14 BRPM | TEMPERATURE: 98 F

## 2024-05-21 VITALS — BODY MASS INDEX: 28.22 KG/M2 | WEIGHT: 212.94 LBS | HEIGHT: 73 IN

## 2024-05-21 DIAGNOSIS — M54.17 LUMBOSACRAL RADICULOPATHY: Primary | ICD-10-CM

## 2024-05-21 DIAGNOSIS — E78.5 HYPERLIPIDEMIA, UNSPECIFIED HYPERLIPIDEMIA TYPE: ICD-10-CM

## 2024-05-21 DIAGNOSIS — G70.00 MYASTHENIA GRAVIS: ICD-10-CM

## 2024-05-21 DIAGNOSIS — M25.461 EFFUSION OF RIGHT KNEE: ICD-10-CM

## 2024-05-21 DIAGNOSIS — I10 PRIMARY HYPERTENSION: ICD-10-CM

## 2024-05-21 DIAGNOSIS — M54.16 LUMBAR RADICULOPATHY, CHRONIC: ICD-10-CM

## 2024-05-21 DIAGNOSIS — M17.0 BILATERAL PRIMARY OSTEOARTHRITIS OF KNEE: ICD-10-CM

## 2024-05-21 DIAGNOSIS — R73.03 PREDIABETES: ICD-10-CM

## 2024-05-21 DIAGNOSIS — M54.17 LUMBOSACRAL RADICULOPATHY: ICD-10-CM

## 2024-05-21 DIAGNOSIS — M17.0 BILATERAL PRIMARY OSTEOARTHRITIS OF KNEE: Primary | ICD-10-CM

## 2024-05-21 DIAGNOSIS — M54.12 CERVICAL RADICULOPATHY: ICD-10-CM

## 2024-05-21 PROCEDURE — 99214 OFFICE O/P EST MOD 30 MIN: CPT | Mod: S$GLB,,, | Performed by: STUDENT IN AN ORGANIZED HEALTH CARE EDUCATION/TRAINING PROGRAM

## 2024-05-21 PROCEDURE — 99999 PR PBB SHADOW E&M-EST. PATIENT-LVL III: CPT | Mod: PBBFAC,,, | Performed by: STUDENT IN AN ORGANIZED HEALTH CARE EDUCATION/TRAINING PROGRAM

## 2024-05-21 PROCEDURE — 3074F SYST BP LT 130 MM HG: CPT | Mod: CPTII,S$GLB,, | Performed by: STUDENT IN AN ORGANIZED HEALTH CARE EDUCATION/TRAINING PROGRAM

## 2024-05-21 PROCEDURE — 1126F AMNT PAIN NOTED NONE PRSNT: CPT | Mod: CPTII,S$GLB,, | Performed by: STUDENT IN AN ORGANIZED HEALTH CARE EDUCATION/TRAINING PROGRAM

## 2024-05-21 PROCEDURE — G2211 COMPLEX E/M VISIT ADD ON: HCPCS | Mod: S$GLB,,, | Performed by: STUDENT IN AN ORGANIZED HEALTH CARE EDUCATION/TRAINING PROGRAM

## 2024-05-21 PROCEDURE — 72100 X-RAY EXAM L-S SPINE 2/3 VWS: CPT | Mod: 26,,, | Performed by: RADIOLOGY

## 2024-05-21 PROCEDURE — 1159F MED LIST DOCD IN RCRD: CPT | Mod: CPTII,S$GLB,, | Performed by: STUDENT IN AN ORGANIZED HEALTH CARE EDUCATION/TRAINING PROGRAM

## 2024-05-21 PROCEDURE — 1160F RVW MEDS BY RX/DR IN RCRD: CPT | Mod: CPTII,S$GLB,, | Performed by: STUDENT IN AN ORGANIZED HEALTH CARE EDUCATION/TRAINING PROGRAM

## 2024-05-21 PROCEDURE — 72100 X-RAY EXAM L-S SPINE 2/3 VWS: CPT | Mod: TC,PO

## 2024-05-21 PROCEDURE — 99214 OFFICE O/P EST MOD 30 MIN: CPT | Mod: 25,S$GLB,, | Performed by: STUDENT IN AN ORGANIZED HEALTH CARE EDUCATION/TRAINING PROGRAM

## 2024-05-21 PROCEDURE — 99999 PR PBB SHADOW E&M-EST. PATIENT-LVL V: CPT | Mod: PBBFAC,,, | Performed by: STUDENT IN AN ORGANIZED HEALTH CARE EDUCATION/TRAINING PROGRAM

## 2024-05-21 PROCEDURE — 3078F DIAST BP <80 MM HG: CPT | Mod: CPTII,S$GLB,, | Performed by: STUDENT IN AN ORGANIZED HEALTH CARE EDUCATION/TRAINING PROGRAM

## 2024-05-21 PROCEDURE — 3288F FALL RISK ASSESSMENT DOCD: CPT | Mod: CPTII,S$GLB,, | Performed by: STUDENT IN AN ORGANIZED HEALTH CARE EDUCATION/TRAINING PROGRAM

## 2024-05-21 PROCEDURE — 1101F PT FALLS ASSESS-DOCD LE1/YR: CPT | Mod: CPTII,S$GLB,, | Performed by: STUDENT IN AN ORGANIZED HEALTH CARE EDUCATION/TRAINING PROGRAM

## 2024-05-21 PROCEDURE — 20611 DRAIN/INJ JOINT/BURSA W/US: CPT | Mod: RT,S$GLB,, | Performed by: STUDENT IN AN ORGANIZED HEALTH CARE EDUCATION/TRAINING PROGRAM

## 2024-05-21 RX ORDER — CYANOCOBALAMIN 1000 UG/ML
1000 INJECTION, SOLUTION INTRAMUSCULAR; SUBCUTANEOUS
COMMUNITY
Start: 2024-05-20

## 2024-05-21 RX ORDER — DULOXETIN HYDROCHLORIDE 30 MG/1
30 CAPSULE, DELAYED RELEASE ORAL DAILY
Qty: 30 CAPSULE | Refills: 0 | Status: SHIPPED | OUTPATIENT
Start: 2024-05-21 | End: 2024-06-20

## 2024-05-21 RX ORDER — TRIAMCINOLONE ACETONIDE 40 MG/ML
40 INJECTION, SUSPENSION INTRA-ARTICULAR; INTRAMUSCULAR
Status: DISCONTINUED | OUTPATIENT
Start: 2024-05-21 | End: 2024-05-21 | Stop reason: HOSPADM

## 2024-05-21 RX ADMIN — TRIAMCINOLONE ACETONIDE 40 MG: 40 INJECTION, SUSPENSION INTRA-ARTICULAR; INTRAMUSCULAR at 10:05

## 2024-05-21 NOTE — PROCEDURES
Sports Medicine US - Guidance for Needle Placement    Date/Time: 5/21/2024 10:40 AM    Performed by: Darrel Goldman MD  Authorized by: Darrel Goldman MD  Preparation: Patient was prepped and draped in the usual sterile fashion.  Local anesthesia used: no    Anesthesia:  Local anesthesia used: no    Sedation:  Patient sedated: no    Patient tolerance: patient tolerated the procedure well with no immediate complications  Comments: Ultrasound guidance was used for needle localization. Images were saved and stored for documentation. The appropriate structures were visualized. Dynamic visualization of the needle was continuous throughout the procedures and maintained good position.

## 2024-05-21 NOTE — PROGRESS NOTES
Chief Complaint   Patient presents with    Hypertension     HPI: Kobi Covington is a 79 y.o. male  with Pmhx listed below who presents to clinic for routine follow up. He reports to be doing well. Denies having any shortness of breath, chest pain, abdominal pain, palpitation, leg swelling, syncopal episode, nausea, vomiting, fever and other complains at present. Medication list has been reviewed and updated along with him. he reports to be compliant with her medication and has no issues taking it. . His only complain today includes lower back pain. As per the patient, he has been having chronic back pain present in right lumbar region for last several years. It was gradual in onset, constant and progressive in nature. He denies prior h/o fall, injury or trauma. Denies having weakness in his legs bilaterally, denies having numbness and tingling sensation. He reports to have Physical therapy done twice in this regard without relief. He has tried gabapentin, mobic as well without relief. Xray ls spine done today revealed Severe disc space narrowing along with vacuum disc phenomenon is seen at the L3-4 and L5-S1 levels. L4-5 disc space is not well identified with probable osseous fusion at this level. Prominent facet arthropathy seen within the mid to lower lumbar spine bilaterally. He wants to be see pain management . He doesn't want opioid at this time. He would likely need a MRI should he undergo any pain modulation procedure in the future which will be ordered today. In the mean time, I will start him on Cymbalta .  His other complain today includes right knee pain. This has been going on for last several years. He describes the pain as sharp, non radiating aggravated with movement and no relieving factors present. Xray knee done on 1/22/2024 revealed Bilateral multi compartment degenerative findings present most prevalent within the lateral compartments including at least moderate joint space loss. He was then  sent to orthopedics whom he saw on 2/06/2024 where he got CSI on his left knee. He know wants the same thing to be done on his other leg. Referral to be sent to orthopedics.         Problem List:  Patient Active Problem List   Diagnosis    Forearm laceration, right, initial encounter    Myasthenia gravis    Hyperlipidemia    Pernicious anemia    Neutropenia, unspecified type    Bilateral carpal tunnel syndrome    Cervical radiculopathy       ROS: Negative except as noted above.       Current Meds:  Current Outpatient Medications   Medication Sig Dispense Refill    cyanocobalamin 1,000 mcg/mL injection Inject 1,000 mcg into the muscle every 30 days.      lisinopriL-hydrochlorothiazide (PRINZIDE,ZESTORETIC) 20-12.5 mg per tablet Take 1 tablet by mouth once daily. 90 tablet 3    DULoxetine (CYMBALTA) 30 MG capsule Take 1 capsule (30 mg total) by mouth once daily. 30 capsule 0    predniSONE (DELTASONE) 10 MG tablet Take 10 mg by mouth once daily. (Patient not taking: Reported on 5/21/2024)       No current facility-administered medications for this visit.      PE:  BP: 126/62  Pulse: 83 Resp: 14   Temp: 98.2 °F (36.8 °C)  Weight: 96.6 kg (212 lb 15.4 oz) Body mass index is 28.1 kg/m².    Wt Readings from Last 5 Encounters:   05/21/24 96.6 kg (212 lb 15.4 oz)   03/12/24 98.9 kg (218 lb 0.6 oz)   03/01/24 98.9 kg (218 lb)   02/20/24 99.2 kg (218 lb 11.1 oz)   02/06/24 100.3 kg (221 lb 1.9 oz)     General appearance: alert and cooperative, not in acute distress  Head: normocephalic, without obvious abnormality, atraumatic  Eyes: conjunctivae/corneas clear. PERRL, EOM's intact.  Ears: clear tympanic membranes   Neck: no adenopathy, supple, symmetrical, trachea midline and thyroid not enlarged, symmetric, no tenderness/mass/nodules, no JVD  Throat: lips, mucosa, and tongue normal; teeth and gums normal; no thrush  Chest: no reproducible chest pain   Heart: regular rate and rhythm, S1, S2 normal, no murmur, click, rub or  gallop  Lungs: unlabored respiration, bilateral equal air entry, normal vesicular breath sound heard, no wheezing, rhonchi   Abdomen: soft, non-tender, non-distended; bowel sounds +; no masses,  no organomegaly, no ascites   Extremities: normal, atraumatic, no cyanosis or edema noted B/L upper and lower extremities.  Skin: skin color, texture, turgor normal. No rashes or lesions noted.  Neurologic: grossly intact      Lab:  Lab Results   Component Value Date    WBC 3.83 (L) 01/23/2024    HGB 11.7 (L) 01/23/2024    HCT 36.0 (L) 01/23/2024    MCV 95 01/23/2024     01/23/2024     01/23/2024    K 3.8 01/23/2024     01/23/2024    CO2 24 01/23/2024    BUN 12 01/23/2024     01/23/2024    CALCIUM 8.5 (L) 01/23/2024    AST 32 01/23/2024    ALT 24 01/23/2024    CHOL 204 (H) 01/23/2024    HDL 58 01/23/2024    LDLCALC 128.8 01/23/2024    TRIG 86 01/23/2024    TSH 1.220 11/20/2023    INR 1.0 04/02/2015       Impression:    ICD-10-CM ICD-9-CM    1. Lumbosacral radiculopathy  M54.17 724.4 X-Ray Lumbar Spine 2 Or 3 Views      DULoxetine (CYMBALTA) 30 MG capsule      Ambulatory referral/consult to Pain Clinic      2. Primary hypertension  I10 401.9 COMPREHENSIVE METABOLIC PANEL      3. Bilateral primary osteoarthritis of knee  M17.0 715.16 Ambulatory referral/consult to Orthopedics      4. Cervical radiculopathy  M54.12 723.4       5. Myasthenia gravis  G70.00 358.00       6. Hyperlipidemia, unspecified hyperlipidemia type  E78.5 272.4       7. Prediabetes  R73.03 790.29 HEMOGLOBIN A1C      1. Lumbosacral radiculopathy  - X-Ray Lumbar Spine 2 Or 3 Views; Future  - DULoxetine (CYMBALTA) 30 MG capsule; Take 1 capsule (30 mg total) by mouth once daily.  Dispense: 30 capsule; Refill: 0  - Ambulatory referral/consult to Pain Clinic; Future    2. Primary hypertension  Low salt diet.  Enouraged to increase in physical activity at least 30 minutes of brisk walking/day , 5 days a week  Advised weight loss    Advised  for DASH diet - The Dietary Approaches to Stop Hypertension (DASH) is high in vegetables, fruits, low-fat dairy products, whole grains, poultry, fish, and nuts and low in sweets, sugar-sweetened beverages, and red meats   Stop smoking.  Limit caffeine intake  Limit alcohol intake <3/day for men and <2/day for women.  Advised to be compliant with medication.  Please monitor your blood pressure regularly at home   Return precaution provided  On prinzide to be cotninued.  - COMPREHENSIVE METABOLIC PANEL; Future    3. Bilateral primary osteoarthritis of knee  - Ambulatory referral/consult to Orthopedics; Future    4. Cervical radiculopathy      5. Myasthenia gravis  Stable on prednisone     6. Hyperlipidemia, unspecified hyperlipidemia type  Reviwed lab    Latest Reference Range & Units 01/23/24 07:36   Cholesterol Total 120 - 199 mg/dL 204 (H)   HDL 40 - 75 mg/dL 58   HDL/Cholesterol Ratio 20.0 - 50.0 % 28.4   Non-HDL Cholesterol mg/dL 146   Total Cholesterol/HDL Ratio 2.0 - 5.0  3.5   Triglycerides 30 - 150 mg/dL 86   LDL Cholesterol 63.0 - 159.0 mg/dL 128.8   (H): Data is abnormally high  Stable and not on statin due to myasthenia gravis    7. Prediabetes   Latest Reference Range & Units 11/20/23 14:42 01/23/24 07:36   Hemoglobin A1C External 4.0 - 5.6 % 6.2 (H) (E) 5.7 (H)   Estimated Avg Glucose 68 - 131 mg/dL  117   (H): Data is abnormally high  (E): External lab result  Stable and not on any medication  - HEMOGLOBIN A1C; Future    8. Lumbar radiculopathy, chronic  - MRI Lumbar Spine Without Contrast; Future      Future Appointments   Date Time Provider Department Center   5/21/2024 10:15 AM [facility] X[facility] [place]   5/21/2024 10:30 AM [facility] [place]   5/21/2024 10:40 AM Darrel Goldman MD [facility] [place]   Repeat lab  Will call him with results  Medication to be adjusted on the basis of lab work    I spent a total of 35 minutes on the day of the visit.This includes face to face  time and non-face to face time preparing to see the patient (eg, review of tests), obtaining and/or reviewing separately obtained history, documenting clinical information in the electronic or other health record, independently interpreting results and communicating results to the patient/family/caregiver, or care coordinator.  Visit today included increased complexity associated with the care of the episodic problem   addressed and managing the longitudinal care of the patient due to the serious and/or complex managed problem(s) .     Rashi Barbosa MD

## 2024-05-21 NOTE — PATIENT INSTRUCTIONS
Assessment:  Kobi Covington is a 79 y.o. male   Chief Complaint   Patient presents with    Right Knee - Pain       No diagnosis found.     Plan:  Ultrasound guided aspiration and cortisone injection of the right knee  We discussed the proper protocols after the injection such as no submerging pools, baths tubs, or hot tubs for 24 hr.  Showering is okay today.  We also discussed that blood sugars can be elevated after an injection and asked patient to properly checked her sugars over the next few days and contact their PCP if there are any concerns.  We discussed red flags such as fevers, chills, red, warm, tender joint at the area of injection to please seek medical care immediately.    Apply topical diclofenac (Voltaren) up to 4 times a day to the affected area.  It can be bought over the counter at any local pharmacy.    Patient may ice every 2 hours for 15 minutes as needed to control pain and swelling.   Follow up as needed        General Arthritis info:    -shiny white stuff at end of a chicken bones is cartilage    -arthritis is wearing away of the cartilage that lines the end of your bones    -osteoarthritis is thought to be a wear and tear phenomenon    -symptoms are due to inflammation of joint causing stiffness, aching, and sometimes swelling    -occasionally sharp pain will occur causing a give way sensation    -Risk factors: genetic, weight, female > male, age    Treatment options:    -maintain healthy weight (every pound is 4 pounds of pressure on the knee)    -daily moderate exercise (walk, bike, swim 30 minutes per day) to keep joints moving    -daily strengthening exercises (through therapy or on own) to keep muscles supporting joint healthy and strong    -glucosamine 1500mg daily (look for USP label on bottle)    -tylenol as needed for pain (follow directions on the bottle)    -anti-inflammatory medication such as alleve may be helpful- take 1-2 tabs twice daily for 7 days. If it helps your pain,  continue. If you do not feel any change, you may stop and then take it as needed.    (you may be given a once daily anti-inflammatory such as MOBIC. If given, avoid other anti-inflammatory medications such as advil, ibuprofen, motrin, naprosyn, alleve, etc)    -if swollen and painful, ice, decrease activity, and take anti-inflammatory daily for 5-7 days and if no relief call your doctor for further options    -consider cortisone injection (every 3-4 months at most)- anti- inflammatory steroid medication that can be injected directly into the joint to reduce inflammation    -consider hyaluronic acid injections (eufflexxa, hyalgan, synvisc, supartz) (every 6 months at most)- protein injection that helps decrease pain and irritability in the joint. It is best used to help prolong intervals between cortisone injections to minimize steroid injections. These are currently approved for knee injections. Discuss with your doctor if other joint involved. Call to seek approval prior to the injections.    -long-term treatment may include a total joint replacement (keep diary of good days and bad days, then evaluate as to when you are ready)      Follow-up: as needed.    Thank you for choosing [facility] and Dr. Darrel Goldman for your orthopedic & sports medicine care. It is our goal to provide you with exceptional care that will help keep you healthy, active, and get you back in the game.    Please do not hesitate to reach out to us via email, phone, or MyChart with any questions, concerns, or feedback.    If you felt that you received exemplary care today, please consider leaving us feedback on OneTok at:  [email or web address]    If you are experiencing pain/discomfort ,or have questions after 5pm and would like to be connected to the [facility] on-call team, please call this number and specify which Sports Medicine  provider is treating you: [phone]

## 2024-05-21 NOTE — PROGRESS NOTES
Patient ID: Kobi Covington  YOB: 1945  MRN: [id number]    Chief Complaint: Pain of the Right Knee      History of Present Illness: Kobi Covington is a right-hand dominant 79 y.o. male who presents today with bilateral knee pain.  As per the patient, he has been having bilateral knee pain for last several years. He reports it to be in the front of knee, worse with movement. Occasional popping,cracking, and giving out. He is here to discuss CSI and aspiration to right knee. Had 43 cc aspirated off left knee and CSI at last visit.     The patient is active in none.        Past Medical History:   Past Medical History:   Diagnosis Date    Arthritis     Myasthenia gravis      Past Surgical History:   Procedure Laterality Date    ROTATOR CUFF REPAIR Left      No family history on file.  Social History     Socioeconomic History    Marital status:    Tobacco Use    Smoking status: Former     Types: Cigarettes     Passive exposure: Past    Smokeless tobacco: Never   Substance and Sexual Activity    Alcohol use: Yes     Comment: occasional beer    Drug use: No     Medication List with Changes/Refills   Current Medications    CYANOCOBALAMIN 1,000 MCG/ML INJECTION    Inject 1,000 mcg into the muscle every 30 days.    DULOXETINE (CYMBALTA) 30 MG CAPSULE    Take 1 capsule (30 mg total) by mouth once daily.    LISINOPRIL-HYDROCHLOROTHIAZIDE (PRINZIDE,ZESTORETIC) 20-12.5 MG PER TABLET    Take 1 tablet by mouth once daily.    PREDNISONE (DELTASONE) 10 MG TABLET    Take 10 mg by mouth once daily.     Review of patient's allergies indicates:  No Known Allergies    Physical Exam:   Body mass index is 28.1 kg/m².    GENERAL: Well appearing, in no acute distress.  HEAD: Normocephalic and atraumatic.  ENT: External ears and nose grossly normal.  EYES: EOMI bilaterally  PULMONARY: Respirations are grossly even and non-labored.  NEURO: Awake, alert, and oriented x 3.  SKIN: No obvious rashes appreciated.  PSYCH:  Mood & affect are appropriate.    Detailed MSK exam:     Right knee exam:   -ROM: extension 0, flexion 110  -TTP: None  -effusion: moderate  -Patellar apprehension negative  -Sophie test negative  -stable to varus and valgus stress tests  -Lachman test negative, anterior drawer test negative, posterior drawer test negative        Imaging:  X-Ray Lumbar Spine 2 Or 3 Views  Narrative: EXAMINATION:  XR LUMBAR SPINE 2 OR 3 VIEWS    CLINICAL HISTORY:  Radiculopathy, lumbosacral region    TECHNIQUE:  AP, lateral and spot images were performed of the lumbar spine.    COMPARISON:  None    FINDINGS:  The vertebral bodies demonstrate a normal height.  There is dextroscoliosis of the lumbar spine.  Severe disc space narrowing along with vacuum disc phenomenon is seen at the L3-4 and L5-S1 levels.  L4-5 disc space is not well identified with probable osseous fusion at this level.  Prominent facet arthropathy seen within the mid to lower lumbar spine bilaterally.  Impression: 1.  As above    Electronically signed by: Josh Ward DO  Date:    05/21/2024  Time:    10:05        Relevant imaging results were reviewed and interpreted by me and per my read shows moderate arthritic changes bilateral knees.  This was discussed with the patient and / or family today.     Assessment:  Kobi Covington is a 79 y.o. male following up for right knee pain and swelling. Knee abrasion from last visit fully healed now and interested in aspiration/injection right knee today. Left knee still doing really well from aspiration/injection in Feb.   Plan: Steroid injection given today (see separate procedure note for details). We discussed the proper protocols after the injection such as no submerging pools, baths tubs, or hot tubs for 24 hr.  Showering is okay today.  We also discussed that blood sugars can be elevated after an injection and asked patient to properly checked her sugars over the next few days and contact their PCP if there are any  concerns.  We discussed red flags such as fevers, chills, red, warm, tender joint at the area of injection to please seek medical care immediately.   26 cc aspirated without difficulty. Consider gel injection if not improving.   Follow up as needed. All questions answered.     Bilateral primary osteoarthritis of knee  -     Sports Medicine US - Guidance for Needle Placement  -     Large Joint Aspiration/Injection: R supra patellar bursa    Effusion of right knee  -     Sports Medicine US - Guidance for Needle Placement  -     Large Joint Aspiration/Injection: R supra patellar bursa         Ultrasound guidance was used for needle localization. Images were saved and stored for documentation. The appropriate structures were visualized. Dynamic visualization of the needle was continuous throughout the procedures and maintained good position.      MEDICAL NECESSITY FOR VISCOSUPPLEMENTATION: After thorough evaluation of the patient, I have determined that visco-supplementation is medically necessary. The patient has painful degenerative changes of the knee with failure of conservative treatments including lifestyle modifications and rehabilitation exercises.  Oral analgesis/NSAIDs have not adequately controlled symptoms and there is radiographic evidence of Kellgren Sherman grade 2 or greater osteoarthritic changes, or in lack of radiographic evidence, there is arthroscopic or other evidence of chondrosis.      Electronically signed:  Darrel Goldman MD, MPH  05/21/2024  10:27 AM

## 2024-05-21 NOTE — PROCEDURES
Large Joint Aspiration/Injection: R supra patellar bursa    Date/Time: 5/21/2024 10:40 AM    Performed by: Darrel Goldman MD  Authorized by: Darrel Goldman MD    Consent Done?:  Yes (Verbal)  Indications:  Arthritis, pain and joint swelling  Site marked: the procedure site was marked    Timeout: prior to procedure the correct patient, procedure, and site was verified    Prep: patient was prepped and draped in usual sterile fashion    Local anesthetic:  Bupivacaine 0.5% without epinephrine and lidocaine 1% without epinephrine    Details:  Needle Size:  18 G  Ultrasonic Guidance for needle placement?: Yes    Images are saved and documented.  Approach:  Anterolateral  Location:  Knee  Site:  R supra patellar bursa  Medications:  40 mg triamcinolone acetonide 40 mg/mL  Aspirate amount (mL):  26  Aspirate:  Yellow  Patient tolerance:  Patient tolerated the procedure well with no immediate complications     Ultrasound guidance was used for needle localization. Images were saved and stored for documentation. The appropriate structures were visualized. Dynamic visualization of the needle was continuous throughout the procedures and maintained good position.

## 2024-05-23 ENCOUNTER — TELEPHONE (OUTPATIENT)
Dept: INTERNAL MEDICINE | Facility: CLINIC | Age: 79
End: 2024-05-23
Payer: COMMERCIAL

## 2024-05-23 NOTE — TELEPHONE ENCOUNTER
----- Message from Christin Lou sent at 5/23/2024  7:51 AM CDT -----  Regarding: MRI appointment.  Good morning,     Patient is scheduled for MRI appointment and referral is not approved at this time.  Can you please let me know if this is medically urgent or can it be moved to a later date?    Thanks  Christin   Radiology Scheduler  [phone]

## 2024-07-21 ENCOUNTER — HOSPITAL ENCOUNTER (EMERGENCY)
Facility: HOSPITAL | Age: 79
Discharge: HOME | End: 2024-07-21
Attending: EMERGENCY MEDICINE
Payer: COMMERCIAL

## 2024-07-21 VITALS
WEIGHT: 210.75 LBS | BODY MASS INDEX: 27.93 KG/M2 | SYSTOLIC BLOOD PRESSURE: 142 MMHG | TEMPERATURE: 98 F | DIASTOLIC BLOOD PRESSURE: 70 MMHG | HEIGHT: 73 IN | OXYGEN SATURATION: 98 % | RESPIRATION RATE: 17 BRPM | HEART RATE: 70 BPM
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
## 2024-07-21 PROCEDURE — 63600175 PHARM REV CODE 636 W HCPCS: Mod: ER | Performed by: PHYSICIAN ASSISTANT

## 2024-07-21 PROCEDURE — 90471 IMMUNIZATION ADMIN: CPT | Mod: ER | Performed by: PHYSICIAN ASSISTANT

## 2024-07-21 PROCEDURE — 25000003 PHARM REV CODE 250: Mod: ER | Performed by: PHYSICIAN ASSISTANT

## 2024-07-21 PROCEDURE — 99284 EMERGENCY DEPT VISIT MOD MDM: CPT | Mod: 25,ER

## 2024-07-21 PROCEDURE — 90715 TDAP VACCINE 7 YRS/> IM: CPT | Mod: ER | Performed by: PHYSICIAN ASSISTANT

## 2024-07-21 RX ORDER — AMOXICILLIN AND CLAVULANATE POTASSIUM 875; 125 MG/1; MG/1
1 TABLET, FILM COATED ORAL
Status: COMPLETED | OUTPATIENT
Start: 2024-07-21 | End: 2024-07-21

## 2024-07-21 RX ORDER — AMOXICILLIN AND CLAVULANATE POTASSIUM 875; 125 MG/1; MG/1
1 TABLET, FILM COATED ORAL 2 TIMES DAILY
Qty: 6 TABLET | Refills: 0 | Status: SHIPPED | OUTPATIENT
Start: 2024-07-21 | End: 2024-07-24

## 2024-07-21 RX ADMIN — AMOXICILLIN AND CLAVULANATE POTASSIUM 1 TABLET: 875; 125 TABLET, FILM COATED ORAL at 01:07

## 2024-07-21 RX ADMIN — TETANUS TOXOID, REDUCED DIPHTHERIA TOXOID AND ACELLULAR PERTUSSIS VACCINE, ADSORBED 0.5 ML: 5; 2.5; 8; 8; 2.5 SUSPENSION INTRAMUSCULAR at 01:07

## 2024-07-21 NOTE — ED NOTES
Cat belongs to nephew, pt dose not know address of nephew in [place], I asked pt to call and get address.

## 2024-07-21 NOTE — ED PROVIDER NOTES
History      Chief Complaint   Patient presents with    Animal Bite     Cat today L leg.       Review of patient's allergies indicates:  No Known Allergies     HPI   HPI    7/21/2024, 1:35 PM   History obtained from the patient      History of Present Illness: Kobi Covington is a 79 y.o. male patient who presents to the Emergency Department for cat bite to left lower leg pta.  Cat belongs to nephew in [place].    No further complaints or concerns at this time.     Animal control notified      PCP: Rashi Barbosa MD       Past Medical History:  Past Medical History:   Diagnosis Date    Arthritis     Myasthenia gravis          Past Surgical History:  Past Surgical History:   Procedure Laterality Date    ROTATOR CUFF REPAIR Left            Family History:  No family history on file.        Social History:  Social History     Tobacco Use    Smoking status: Former     Types: Cigarettes     Passive exposure: Past    Smokeless tobacco: Never   Substance and Sexual Activity    Alcohol use: Yes     Comment: occasional beer    Drug use: No    Sexual activity: Not on file       ROS     Review of Systems   Skin:  Positive for wound.       Physical Exam      Initial Vitals [07/21/24 1249]   BP Pulse Resp Temp SpO2   (!) 142/70 70 17 97.7 °F (36.5 °C) 98 %      MAP       --         Physical Exam  Vital signs and nursing notes reviewed.  Constitutional: Patient is in NAD. Awake and alert. Well-developed and well-nourished.  Head: Atraumatic. Normocephalic.  Eyes:  EOM intact. Conjunctivae nl. No scleral icterus.  ENT: Mucous membranes are moist.   Neck: Supple.  No meningismus  Cardiovascular: Regular rate and rhythm. No murmurs, rubs, or gallops.   Pulmonary/Chest: No respiratory distress. Clear to auscultation bilaterally. No wheezing, rales, or rhonchi.  Musculoskeletal: Moves all extremities. No edema.   Skin: Warm and dry. Left lower leg with blood in jeans but unable to visualize wound.    Neurological: Awake and  "alert. No acute focal neurological deficits are appreciated.  Psychiatric: Normal affect. Good eye contact. Appropriate in content.      ED Course          Procedures  ED Vital Signs:  Vitals:    07/21/24 1249   BP: (!) 142/70   Pulse: 70   Resp: 17   Temp: 97.7 °F (36.5 °C)   TempSrc: Oral   SpO2: 98%   Weight: 95.6 kg (210 lb 12.2 oz)   Height: 6' 1" (1.854 m)                 Imaging Results:  Imaging Results    None            The Emergency Provider reviewed the vital signs and test results, which are outlined above.    ED Discussion             Medication(s) given in the ER:  Medications   Tdap (BOOSTRIX) vaccine injection 0.5 mL (0.5 mLs Intramuscular Given 7/21/24 1323)   amoxicillin-clavulanate 875-125mg per tablet 1 tablet (1 tablet Oral Given 7/21/24 1324)            Follow-up Information       Rashi Barbosa MD In 2 days.    Specialties: Internal Medicine, Family Medicine  Contact information:  [id number] [place] [address]  [place] [address]  [phone]                                    Medication List        START taking these medications      amoxicillin-clavulanate 875-125mg 875-125 mg per tablet  Commonly known as: AUGMENTIN  Take 1 tablet by mouth 2 (two) times daily. for 3 days            ASK your doctor about these medications      cyanocobalamin 1,000 mcg/mL injection     DULoxetine 30 MG capsule  Commonly known as: CYMBALTA  Take 1 capsule (30 mg total) by mouth once daily.     lisinopriL-hydrochlorothiazide 20-12.5 mg per tablet  Commonly known as: PRINZIDE,ZESTORETIC  Take 1 tablet by mouth once daily.     predniSONE 10 MG tablet  Commonly known as: DELTASONE               Where to Get Your Medications        These medications were sent to [facility] - [address]  [address] [place] [address]      Phone: [phone]   amoxicillin-clavulanate 875-125mg 875-125 mg per tablet             Medical Decision Making        All findings were reviewed with the " patient/family in detail.   All remaining questions and concerns were addressed at that time.  Patient/family has been counseled regarding the need for follow-up as well as the indication to return to the emergency room should new or worrisome developments occur.        MDM                 Clinical Impression:        ICD-10-CM ICD-9-CM   1. Cat bite, initial encounter  W55.01XA 879.8     E906.3               Claudia Rosas, PA-C  07/21/24 9050

## 2024-07-31 ENCOUNTER — TELEPHONE (OUTPATIENT)
Dept: ORTHOPEDICS | Facility: CLINIC | Age: 79
End: 2024-07-31
Payer: COMMERCIAL

## 2024-07-31 NOTE — TELEPHONE ENCOUNTER
----- Message from Harry Lincoln LPN sent at 7/23/2024  1:15 PM CDT -----    ----- Message -----  From: Irena Juarez  Sent: 7/23/2024   1:13 PM CDT  To: [facility] Staff    Patient is requesting a call back regarding have the same issue. Call back number is [phone]. Thx.EL

## 2024-07-31 NOTE — TELEPHONE ENCOUNTER
Returned call to pt as requested below. States having right hand pain, and ready to have the surgery he & Dr. Barajas discussed when given the shot. Advised that Dr. Barajas is out on maternity leave until October, but if looking to get surg sooner can get him scheduled with our new hand surgeon Dr. Canelo Craig. Pt agreed to schedule 8/21/24 @ 9:30

## 2024-12-23 DIAGNOSIS — G70.00 MYASTHENIA GRAVIS: Primary | ICD-10-CM

## 2024-12-23 RX ORDER — MELOXICAM 15 MG/1
15 TABLET ORAL DAILY
Qty: 30 TABLET | Refills: 2 | Status: SHIPPED | OUTPATIENT
Start: 2024-12-23 | End: 2025-03-23

## 2024-12-23 RX ORDER — PREDNISONE 10 MG/1
10 TABLET ORAL DAILY
Qty: 30 TABLET | Refills: 2 | Status: SHIPPED | OUTPATIENT
Start: 2024-12-23 | End: 2025-03-23

## 2024-12-26 RX ORDER — CYANOCOBALAMIN 1000 UG/ML
1000 INJECTION, SOLUTION INTRAMUSCULAR; SUBCUTANEOUS
Qty: 3 ML | Refills: 1 | Status: SHIPPED | OUTPATIENT
Start: 2024-12-26 | End: 2025-06-24

## 2025-01-08 ENCOUNTER — OFFICE VISIT (OUTPATIENT)
Dept: INTERNAL MEDICINE | Facility: CLINIC | Age: 80
End: 2025-01-08
Payer: COMMERCIAL

## 2025-01-08 ENCOUNTER — HOSPITAL ENCOUNTER (OUTPATIENT)
Dept: CARDIOLOGY | Facility: HOSPITAL | Age: 80
Discharge: HOME | End: 2025-01-08
Payer: COMMERCIAL

## 2025-01-08 VITALS
OXYGEN SATURATION: 100 % | BODY MASS INDEX: 31.09 KG/M2 | HEART RATE: 86 BPM | SYSTOLIC BLOOD PRESSURE: 136 MMHG | DIASTOLIC BLOOD PRESSURE: 80 MMHG | WEIGHT: 234.56 LBS | HEIGHT: 73 IN | TEMPERATURE: 98 F

## 2025-01-08 DIAGNOSIS — M54.17 LUMBOSACRAL RADICULOPATHY: ICD-10-CM

## 2025-01-08 DIAGNOSIS — G56.03 BILATERAL CARPAL TUNNEL SYNDROME: ICD-10-CM

## 2025-01-08 DIAGNOSIS — I10 PRIMARY HYPERTENSION: ICD-10-CM

## 2025-01-08 DIAGNOSIS — G70.00 MYASTHENIA GRAVIS: ICD-10-CM

## 2025-01-08 DIAGNOSIS — E78.5 HYPERLIPIDEMIA, UNSPECIFIED HYPERLIPIDEMIA TYPE: ICD-10-CM

## 2025-01-08 DIAGNOSIS — R07.89 CHEST TIGHTNESS: ICD-10-CM

## 2025-01-08 DIAGNOSIS — M54.16 LUMBAR RADICULOPATHY, CHRONIC: Primary | ICD-10-CM

## 2025-01-08 DIAGNOSIS — R73.03 PREDIABETES: ICD-10-CM

## 2025-01-08 LAB
OHS QRS DURATION: 84 MS
OHS QTC CALCULATION: 434 MS

## 2025-01-08 PROCEDURE — 93005 ELECTROCARDIOGRAM TRACING: CPT | Mod: PO

## 2025-01-08 PROCEDURE — 99999 PR PBB SHADOW E&M-EST. PATIENT-LVL V: CPT | Mod: PBBFAC,,, | Performed by: STUDENT IN AN ORGANIZED HEALTH CARE EDUCATION/TRAINING PROGRAM

## 2025-01-08 PROCEDURE — 93010 ELECTROCARDIOGRAM REPORT: CPT | Mod: ,,, | Performed by: INTERNAL MEDICINE

## 2025-01-08 RX ORDER — PREDNISONE 20 MG/1
TABLET ORAL
COMMUNITY
Start: 2024-07-24

## 2025-01-08 RX ORDER — DULOXETIN HYDROCHLORIDE 30 MG/1
30 CAPSULE, DELAYED RELEASE ORAL DAILY
Qty: 90 CAPSULE | Refills: 0 | Status: SHIPPED | OUTPATIENT
Start: 2025-01-08 | End: 2025-04-08

## 2025-01-08 NOTE — PROGRESS NOTES
Chief Complaint   Patient presents with    Back Pain     HPI: Kobi Covington is a 79 y.o. male  with Pmhx listed below who presents to clinic for    History of Present Illness    CHIEF COMPLAINT:  - Mr. Covington presents with chronic right-sided back pain that radiates down his leg, which has been ongoing for almost a year.    HPI:  Mr. Covington reports right-sided back pain radiating down his leg for almost a year. The pain is localized to the right side but occasionally moves to the other side. He describes nerve pain in his back. He has been managing his pain with gabapentin and Tylenol 500mg, which provide minimal relief. The gabapentin causes drowsiness.   As per the patient, he has been having chronic back pain present in right lumbar region for last several years. It was gradual in onset, constant and progressive in nature. He denies prior h/o fall, injury or trauma. Denies having weakness in his legs bilaterally, denies having numbness and tingling sensation. He reports to have Physical therapy done twice in this regard without relief. He has tried gabapentin, mobic as well without relief. Xray ls spine done today revealed Severe disc space narrowing along with vacuum disc phenomenon is seen at the L3-4 and L5-S1 levels. L4-5 disc space is not well identified with probable osseous fusion at this level. Prominent facet arthropathy seen within the mid to lower lumbar spine bilaterally. He wants to be see pain management . He doesn't want opioid at this time.     I saw him last on 5/21/2024 for the same reason. During the visit, MRI LS Spine was ordered. It was denied by his insurance. He was sent to Pain management at [place], he did not go yet. He is willing to try pain management at [place]. I also started him on Cymbalta on his last visit which he did not take.    Other problem today include numbness in his hand beneath the wrist bilaterally where his right is worse then the left. He had EMG done on  03/01/2024 which showed electrodiagnostic evidence of a severe demyelinating and axonal median neuropathy (Carpal tunnel syndrome) across the right wrist and a mild demyelinating CTS across the left wrist. There is an acute on chronic radiculopathy of the left C8, T1 nerve roots and a chronic radiculopathy of the right C8,T1 and bilateral C5 nerve roots     He was subsequently seen by dr Barajas on 3/12/2024 where he had steroid shot in the wrist which provided some relief. He had a follow up on 8/21/2024 which he did not go due to transportation issues to [place]. He is willing to see orthopedics where in [place] however.     Problem List:  Patient Active Problem List   Diagnosis    Forearm laceration, right, initial encounter    Myasthenia gravis    Hyperlipidemia    Pernicious anemia    Neutropenia, unspecified type    Bilateral carpal tunnel syndrome    Cervical radiculopathy       ROS: Negative except as noted above.       Current Meds:  Current Outpatient Medications   Medication Sig Dispense Refill    cyanocobalamin 1,000 mcg/mL injection Inject 1 mL (1,000 mcg total) into the muscle every 30 days. 3 mL 1    lisinopriL-hydrochlorothiazide (PRINZIDE,ZESTORETIC) 20-12.5 mg per tablet Take 1 tablet by mouth once daily. 90 tablet 3    meloxicam (MOBIC) 15 MG tablet Take 1 tablet (15 mg total) by mouth once daily. 30 tablet 2    predniSONE (DELTASONE) 20 MG tablet Take by mouth.      DULoxetine (CYMBALTA) 30 MG capsule Take 1 capsule (30 mg total) by mouth once daily. 90 capsule 0     No current facility-administered medications for this visit.      PE:  BP: 136/80  Pulse: 86     Temp: 97.8 °F (36.6 °C)  Weight: 106.4 kg (234 lb 9.1 oz) Body mass index is 30.95 kg/m².    Wt Readings from Last 5 Encounters:   01/08/25 106.4 kg (234 lb 9.1 oz)   07/21/24 95.6 kg (210 lb 12.2 oz)   05/21/24 96.6 kg (212 lb 15.4 oz)   05/21/24 96.6 kg (212 lb 15.4 oz)   03/12/24 98.9 kg (218 lb 0.6 oz)     General appearance:  alert and cooperative, not in acute distress  Head: normocephalic, without obvious abnormality, atraumatic  Eyes: conjunctivae/corneas clear. PERRL, EOM's intact.  Ears: clear tympanic membranes   Neck: no adenopathy, supple, symmetrical, trachea midline and thyroid not enlarged, symmetric, no tenderness/mass/nodules, no JVD  Throat: lips, mucosa, and tongue normal; teeth and gums normal; no thrush  Chest: no reproducible chest pain   Heart: regular rate and rhythm, S1, S2 normal, no murmur, click, rub or gallop  Lungs: unlabored respiration, bilateral equal air entry, normal vesicular breath sound heard, no wheezing, rhonchi   Abdomen: soft, non-tender, non-distended; bowel sounds +; no masses,  no organomegaly, no ascites   Extremities: normal, atraumatic, no cyanosis or edema noted B/L upper and lower extremities.  Skin: skin color, texture, turgor normal. No rashes or lesions noted.  Neurologic: grossly intact      Lab:  Lab Results   Component Value Date    WBC 3.83 (L) 01/23/2024    HGB 11.7 (L) 01/23/2024    HCT 36.0 (L) 01/23/2024    MCV 95 01/23/2024     01/23/2024     05/21/2024    K 3.8 05/21/2024     05/21/2024    CO2 23 05/21/2024    BUN 19 05/21/2024     05/21/2024    CALCIUM 8.8 05/21/2024    AST 19 05/21/2024    ALT 16 05/21/2024    CHOL 204 (H) 01/23/2024    HDL 58 01/23/2024    LDLCALC 128.8 01/23/2024    TRIG 86 01/23/2024    TSH 1.220 11/20/2023    INR 1.0 04/02/2015       Impression:    ICD-10-CM ICD-9-CM    1. Lumbar radiculopathy, chronic  M54.16 724.4 Ambulatory referral/consult to Pain Clinic      CANCELED: Ambulatory referral/consult to Pain Clinic      2. Myasthenia gravis  G70.00 358.00       3. Bilateral carpal tunnel syndrome  G56.03 354.0 Ambulatory referral/consult to Orthopedics      4. Lumbosacral radiculopathy  M54.17 724.4 DULoxetine (CYMBALTA) 30 MG capsule      5. Prediabetes  R73.03 790.29 COMPREHENSIVE METABOLIC PANEL      HEMOGLOBIN A1C      6.  Primary hypertension  I10 401.9       7. Hyperlipidemia, unspecified hyperlipidemia type  E78.5 272.4 LIPID PANEL      8. Chest tightness  R07.89 786.59 EKG 12-lead      TROPONIN I          Assessment & Plan    MYASTHENIA GRAVIS:  - Mr. Covington reports ongoing back problems with radiating pain down the leg, alternating sides, and daily difficulty with extended ambulation.  - Previous X-rays show degenerative changes.  - An MRI was not approved by insurance.  - Current pain management with gabapentin and acetaminophen 500mg has minimal effect.    CHRONIC RIGHT-SIDED BACK PAIN:  - Evaluated chronic right-sided back pain radiating to leg, ongoing for approximately 1 year.  - Previous treatment with injections provided some relief.  - Considered pain management referral for nerve block injections as an alternative to opioid medications.  - Educated patient on nerve blocks and referred to [facility] in [place] for evaluation and potential treatment.    CHEST TIGHTNESS:  - Evaluated for potential cardiac issues due to reported chest tightness, though patient denies dyspnea or other cardiac symptoms.  - Ordered EKG for further evaluation.    HYPERTENSION:  - Noted that the patient is taking blood pressure medication which is working well.    SMOKING CESSATION:  - Confirmed that the patient quit smoking 40-50 years ago, after smoking for approximately 10 years.    OTHER INSTRUCTIONS:  - Mr. Covington to continue working as it provides physical activity and mental stimulation.    FOLLOW UP:  - Contact office if experiencing worsening symptoms or new concerns before next appointment.  - Follow-up in 2-3 weeks to assess effectiveness of duloxetine and discuss potential dose increase if needed.            1. Myasthenia gravis  Stable on prednisone     2. Lumbar radiculopathy, chronic (Primary)  - Ambulatory referral/consult to Pain Clinic; Future    3. Bilateral carpal tunnel syndrome  - Ambulatory referral/consult to  Orthopedics; Future    4. Lumbosacral radiculopathy  - DULoxetine (CYMBALTA) 30 MG capsule; Take 1 capsule (30 mg total) by mouth once daily.  Dispense: 90 capsule; Refill: 0    5. Prediabetes  Repeat lab and proceed  - COMPREHENSIVE METABOLIC PANEL; Future  - HEMOGLOBIN A1C; Future    6. Primary hypertension  Low salt diet.  Enouraged to increase in physical activity at least 30 minutes of brisk walking/day , 5 days a week  Advised weight loss    Advised for DASH diet - The Dietary Approaches to Stop Hypertension (DASH) is high in vegetables, fruits, low-fat dairy products, whole grains, poultry, fish, and nuts and low in sweets, sugar-sweetened beverages, and red meats   Stop smoking.  Limit caffeine intake  Limit alcohol intake <3/day for men and <2/day for women.  Advised to be compliant with medication.  Please monitor your blood pressure regularly at home   Return precaution provided  On prinzide to be cotninued.    7. Hyperlipidemia, unspecified hyperlipidemia type  Stable and not on statin due to myasthenia gravis   - LIPID PANEL; Future    8. Chest tightness    - EKG 12-lead; Future  - TROPONIN I; Future    Future Appointments   Date Time Provider Department Center   1/10/2025  8:40 AM [facility] [place]   2/4/2025 11:40 AM Darrel Goldman MD [facility] [place]   3/19/2025  8:00 AM Abdoulaye Holloway MD [facility]       I spent a total of 32 minutes on the day of the visit.This includes face to face time and non-face to face time preparing to see the patient (eg, review of tests), obtaining and/or reviewing separately obtained history, documenting clinical information in the electronic or other health record, independently interpreting results and communicating results to the patient/family/caregiver, or care coordinator.  Visit today included increased complexity associated with the care of the episodic problem   addressed and managing the longitudinal care of the patient due  to the serious and/or complex managed problem(s) .     Rashi Barbosa MD    This note was generated with the assistance of ambient listening technology. Verbal consent was obtained by the patient and accompanying visitor(s) for the recording of patient appointment to facilitate this note. I attest to having reviewed and edited the generated note for accuracy, though some syntax or spelling errors may persist. Please contact the author of this note for any clarification.

## 2025-01-31 ENCOUNTER — TELEPHONE (OUTPATIENT)
Dept: INTERNAL MEDICINE | Facility: CLINIC | Age: 80
End: 2025-01-31
Payer: MEDICARE

## 2025-01-31 NOTE — TELEPHONE ENCOUNTER
----- Message from JESUS Johns sent at 1/31/2025  2:18 PM CST -----  Contact: Kobi  He would need an appointment. Unable to prescribe drops without looking in the ear. May need to go to urgent care to be evaluated.     JESUS Johns  ----- Message -----  From: Sulema Bland LPN  Sent: 1/31/2025   1:51 PM CST  To: JESUS Temple    Earache for a couple of days wants to know if he can get drops called in.  ----- Message -----  From: Waleska Zamora  Sent: 1/31/2025  12:04 PM CST  To: [facility] Staff    Type:  Needs Medical Advice    Who Called: Kobi  Symptoms (please be specific): L ear, earache   How long has patient had these symptoms:  /  Pharmacy name and phone #:      [id number] - [place], [place] - [address]  AT [address] [address]  [place] [address]  Phone: [phone] Fax: [phone]     Would the patient rather a call back or a response via [facility]? call  Best Call Back Number: [phone]   Additional Information:

## 2025-02-11 ENCOUNTER — OFFICE VISIT (OUTPATIENT)
Dept: ORTHOPEDICS | Facility: CLINIC | Age: 80
End: 2025-02-11
Payer: COMMERCIAL

## 2025-02-11 VITALS — WEIGHT: 234.56 LBS | BODY MASS INDEX: 31.09 KG/M2 | HEIGHT: 73 IN

## 2025-02-11 DIAGNOSIS — G56.03 BILATERAL CARPAL TUNNEL SYNDROME: ICD-10-CM

## 2025-02-11 PROCEDURE — 1160F RVW MEDS BY RX/DR IN RCRD: CPT | Mod: CPTII,S$GLB,, | Performed by: STUDENT IN AN ORGANIZED HEALTH CARE EDUCATION/TRAINING PROGRAM

## 2025-02-11 PROCEDURE — 99999 PR PBB SHADOW E&M-EST. PATIENT-LVL III: CPT | Mod: PBBFAC,,, | Performed by: STUDENT IN AN ORGANIZED HEALTH CARE EDUCATION/TRAINING PROGRAM

## 2025-02-11 PROCEDURE — 99214 OFFICE O/P EST MOD 30 MIN: CPT | Mod: 25,S$GLB,, | Performed by: STUDENT IN AN ORGANIZED HEALTH CARE EDUCATION/TRAINING PROGRAM

## 2025-02-11 PROCEDURE — 1125F AMNT PAIN NOTED PAIN PRSNT: CPT | Mod: CPTII,S$GLB,, | Performed by: STUDENT IN AN ORGANIZED HEALTH CARE EDUCATION/TRAINING PROGRAM

## 2025-02-11 PROCEDURE — 76942 ECHO GUIDE FOR BIOPSY: CPT | Mod: S$GLB,,, | Performed by: STUDENT IN AN ORGANIZED HEALTH CARE EDUCATION/TRAINING PROGRAM

## 2025-02-11 PROCEDURE — 1159F MED LIST DOCD IN RCRD: CPT | Mod: CPTII,S$GLB,, | Performed by: STUDENT IN AN ORGANIZED HEALTH CARE EDUCATION/TRAINING PROGRAM

## 2025-02-11 PROCEDURE — 20526 THER INJECTION CARP TUNNEL: CPT | Mod: 50,S$GLB,, | Performed by: STUDENT IN AN ORGANIZED HEALTH CARE EDUCATION/TRAINING PROGRAM

## 2025-02-11 RX ORDER — BETAMETHASONE SODIUM PHOSPHATE AND BETAMETHASONE ACETATE 3; 3 MG/ML; MG/ML
6 INJECTION, SUSPENSION INTRA-ARTICULAR; INTRALESIONAL; INTRAMUSCULAR; SOFT TISSUE
Status: DISCONTINUED | OUTPATIENT
Start: 2025-02-11 | End: 2025-02-11 | Stop reason: HOSPADM

## 2025-02-11 RX ADMIN — BETAMETHASONE SODIUM PHOSPHATE AND BETAMETHASONE ACETATE 6 MG: 3; 3 INJECTION, SUSPENSION INTRA-ARTICULAR; INTRALESIONAL; INTRAMUSCULAR; SOFT TISSUE at 09:02

## 2025-02-11 NOTE — PROCEDURES
Sports Medicine US - Guidance for Needle Placement    Date/Time: 2/11/2025 9:00 AM    Performed by: Darrel Goldman MD  Authorized by: Darrel Goldman MD  Preparation: Patient was prepped and draped in the usual sterile fashion.  Local anesthesia used: no    Anesthesia:  Local anesthesia used: no    Sedation:  Patient sedated: no    Patient tolerance: patient tolerated the procedure well with no immediate complications  Comments: Ultrasound guidance was used for needle localization. Images were saved and stored for documentation. The appropriate structures were visualized. Dynamic visualization of the needle was continuous throughout the procedures and maintained good position.

## 2025-02-11 NOTE — PROCEDURES
Carpal Tunnel: R carpal tunnel, L carpal tunnel    Date/Time: 2/11/2025 9:00 AM    Performed by: Darrel Goldman MD  Authorized by: Darrel Goldman MD    Consent Done?:  Yes (Verbal)  Indications:  Pain  Site marked: the procedure site was marked    Timeout: prior to procedure the correct patient, procedure, and site was verified    Prep: patient was prepped and draped in usual sterile fashion      Local anesthetic:  Lidocaine 1% without epinephrine  Location:  Wrist  Site:  R carpal tunnel and L carpal tunnel  Ultrasonic Guidance for Needle Placement?: Yes    Needle size:  25 G  Approach:  Volar  Medications:  6 mg betamethasone acetate-betamethasone sodium phosphate 6 mg/mL  Patient tolerance:  Patient tolerated the procedure well with no immediate complications     Additional Comments: Ultrasound guidance was used for needle localization. Images were saved and stored for documentation. The appropriate structures were visualized. Dynamic visualization of the needle was continuous throughout the procedures and maintained good position.

## 2025-02-11 NOTE — PROGRESS NOTES
Patient ID: Kobi Covington  YOB: 1945  MRN: [id number]    Chief Complaint: Pain of the Left Hand, Pain of the Right Hand, and Follow-up      Referred By: self    History of Present Illness: Kobi Covington is a right-hand dominant 80 y.o. 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  Symptoms on the right are worse than the left.  Symptoms have taken place for a proximally 3 months or so.  Patient experiences 5/10 pain at baseline but 10/10 pain at night and while driving.  Patient regularly wakes up from sleep with numbness in the right-hand.  He has a history of diabetes with a most recent hemoglobin A1c of 6.2 on 11/20/2023. His last carpal tunnel injections were on 3/12/24 with dr. Barajas. He would like to try another CSI in both hands today.          Past Medical History:   Past Medical History:   Diagnosis Date    Arthritis     Myasthenia gravis      Past Surgical History:   Procedure Laterality Date    ROTATOR CUFF REPAIR Left      No family history on file.  Social History     Socioeconomic History    Marital status:    Tobacco Use    Smoking status: Former     Types: Cigarettes     Passive exposure: Past    Smokeless tobacco: Never   Substance and Sexual Activity    Alcohol use: Yes     Comment: occasional beer    Drug use: No     Medication List with Changes/Refills   Current Medications    CYANOCOBALAMIN 1,000 MCG/ML INJECTION    Inject 1 mL (1,000 mcg total) into the muscle every 30 days.    DULOXETINE (CYMBALTA) 30 MG CAPSULE    Take 1 capsule (30 mg total) by mouth once daily.    LISINOPRIL-HYDROCHLOROTHIAZIDE (PRINZIDE,ZESTORETIC) 20-12.5 MG PER TABLET    Take 1 tablet by mouth once daily.    MELOXICAM (MOBIC) 15 MG TABLET    Take 1 tablet (15 mg total) by mouth once daily.    PREDNISONE (DELTASONE) 20 MG TABLET    Take by mouth.     Review of patient's allergies indicates:  No Known Allergies    Physical Exam:    Body mass index is 30.95 kg/m².    GENERAL: Well appearing, in no acute distress.  HEAD: Normocephalic and atraumatic.  ENT: External ears and nose grossly normal.  EYES: EOMI bilaterally  PULMONARY: Respirations are grossly even and non-labored.  NEURO: Awake, alert, and oriented x 3.  SKIN: No obvious rashes appreciated.  PSYCH: Mood & affect are appropriate.    Detailed MSK exam:     Right hand/wrist exam:   -TTP: none  -Swelling/ecchymosis: none  -Full ROM  - strength 5/5  -Sensation intact  -Pulses 2+  -Rotational deformity: negative  -Tinel sign: positive  -Phalen sign: positive  -Finkelstein sign: negative    Left hand/wrist exam:   -TTP: none  -Swelling/ecchymosis: none  -Full ROM  - strength 5/5  -Sensation intact  -Pulses 2+  -Rotational deformity: negative  -Tinel sign: positive  -Phalen sign: positive  -Finkelstein sign: negative      Imaging:  Sports Medicine US - Guidance for Needle Placement  Darrel Goldman MD     5/21/2024 10:43 AM  Sports Medicine US - Guidance for Needle Placement    Date/Time: 5/21/2024 10:40 AM    Performed by: Darrel Goldman MD  Authorized by: Darrel Goldman MD  Preparation: Patient was prepped and   draped in the usual sterile fashion.  Local anesthesia used: no    Anesthesia:  Local anesthesia used: no    Sedation:  Patient sedated: no    Patient tolerance: patient tolerated the procedure well with no immediate   complications  Comments: Ultrasound guidance was used for needle localization. Images   were saved and stored for documentation. The appropriate structures were   visualized. Dynamic visualization of the needle was continuous throughout   the procedures and maintained good position.   X-Ray Lumbar Spine 2 Or 3 Views  Narrative: EXAMINATION:  XR LUMBAR SPINE 2 OR 3 VIEWS    CLINICAL HISTORY:  Radiculopathy, lumbosacral region    TECHNIQUE:  AP, lateral and spot images were performed of the lumbar spine.    COMPARISON:  None    FINDINGS:  The  vertebral bodies demonstrate a normal height.  There is dextroscoliosis of the lumbar spine.  Severe disc space narrowing along with vacuum disc phenomenon is seen at the L3-4 and L5-S1 levels.  L4-5 disc space is not well identified with probable osseous fusion at this level.  Prominent facet arthropathy seen within the mid to lower lumbar spine bilaterally.  Impression: 1.  As above    Electronically signed by: Josh Ward DO  Date:    05/21/2024  Time:    10:05        Relevant imaging results were reviewed and interpreted by me and per my read shows no acute abnormalities.  This was discussed with the patient and / or family today.     Assessment:  Kobi Covington is a 80 y.o. male presenting with bilateral hand numbness/tingling.   History, physical and radiographs are consistent with a likely diagnosis of bilateral CTS.   Plan: Steroid injection given today (see separate procedure note for details). We discussed the proper protocols after the injection such as no submerging pools, baths tubs, or hot tubs for 24 hr.  Showering is okay today.  We also discussed that blood sugars can be elevated after an injection and asked patient to properly checked her sugars over the next few days and contact their PCP if there are any concerns.  We discussed red flags such as fevers, chills, red, warm, tender joint at the area of injection to please seek medical care immediately. Continue conservative management for pain.   Follow up as needed. All questions answered.      Bilateral carpal tunnel syndrome  -     Ambulatory referral/consult to Orthopedics  -     Sports Medicine US - Guidance for Needle Placement  -     Carpal Tunnel: R carpal tunnel, L carpal tunnel         Ultrasound guidance was used for needle localization. Images were saved and stored for documentation. The appropriate structures were visualized. Dynamic visualization of the needle was continuous throughout the procedures and maintained good position.       A copy of today's visit note has been sent to the referring provider.     Electronically signed:  Darrel Goldman MD, MPH  02/11/2025  9:08 AM

## 2025-02-11 NOTE — PATIENT INSTRUCTIONS
Assessment:  Kobi Covington is a 80 y.o. male   Chief Complaint   Patient presents with    Left Hand - Pain    Right Hand - Pain    Follow-up       Encounter Diagnosis   Name Primary?    Bilateral carpal tunnel syndrome         Plan:  Ultrasound guided cortisone injections to bilateral carpal tunnel  We discussed the proper protocols after the injection such as no submerging pools, baths tubs, or hot tubs for 24 hr.  Showering is okay today.  We also discussed that blood sugars can be elevated after an injection and asked patient to properly checked her sugars over the next few days and contact their PCP if there are any concerns.  We discussed red flags such as fevers, chills, red, warm, tender joint at the area of injection to please seek medical care immediately.    Follow up as needed    Follow-up: as needed.    Thank you for choosing [facility] and Dr. Darrel Goldman for your orthopedic & sports medicine care. It is our goal to provide you with exceptional care that will help keep you healthy, active, and get you back in the game.    Please do not hesitate to reach out to us via email, phone, or MyChart with any questions, concerns, or feedback.    If you felt that you received exemplary care today, please consider leaving us feedback on "[employer]"s at:  [email or web address]    If you are experiencing pain/discomfort ,or have questions after 5pm and would like to be connected to the [facility] on-call team, please call this number and specify which Sports Medicine provider is treating you: [phone]

## 2025-02-22 DIAGNOSIS — Z00.00 ENCOUNTER FOR MEDICARE ANNUAL WELLNESS EXAM: ICD-10-CM

## 2025-03-22 DIAGNOSIS — G70.00 MYASTHENIA GRAVIS: ICD-10-CM

## 2025-03-22 NOTE — TELEPHONE ENCOUNTER
Care Due:                  Date            Visit Type   Department     Provider  --------------------------------------------------------------------------------                                EP -                              PRIMARY      [facility] INTERNAL  Last Visit: 01-      CARE ([facility])   MEDICINE       Rashi Barbosa  Next Visit: None Scheduled  None         None Found                                                            Last  Test          Frequency    Reason                     Performed    Due Date  --------------------------------------------------------------------------------    CBC.........  12 months..  meloxicam................  01- 01-    CMP.........  12 months..  DULoxetine,                05- 05-                             lisinopriL-hydrochlorothi                             azide, meloxicam.........    [employer] Embedded Care Due Messages. Reference number: [id number].   3/22/2025 3:59:06 AM CDT

## 2025-03-23 DIAGNOSIS — I10 PRIMARY HYPERTENSION: ICD-10-CM

## 2025-03-23 NOTE — TELEPHONE ENCOUNTER
No care due was identified.  [facility] Embedded Care Due Messages. Reference number: [id number].   3/23/2025 4:03:06 AM CDT

## 2025-03-24 DIAGNOSIS — G70.00 MYASTHENIA GRAVIS: ICD-10-CM

## 2025-03-24 DIAGNOSIS — M54.16 LUMBAR RADICULOPATHY, CHRONIC: Primary | ICD-10-CM

## 2025-03-24 RX ORDER — MELOXICAM 15 MG/1
TABLET ORAL
Qty: 30 TABLET | Refills: 2 | OUTPATIENT
Start: 2025-03-24

## 2025-03-24 RX ORDER — LISINOPRIL AND HYDROCHLOROTHIAZIDE 12.5; 2 MG/1; MG/1
1 TABLET ORAL DAILY
Qty: 90 TABLET | Refills: 0 | Status: SHIPPED | OUTPATIENT
Start: 2025-03-24

## 2025-03-24 RX ORDER — MELOXICAM 15 MG/1
15 TABLET ORAL DAILY
Qty: 30 TABLET | Refills: 2 | Status: SHIPPED | OUTPATIENT
Start: 2025-03-24 | End: 2025-06-22

## 2025-03-24 NOTE — TELEPHONE ENCOUNTER
No care due was identified.  [facility] Embedded Care Due Messages. Reference number: [id number].   3/24/2025 11:25:17 AM CDT

## 2025-03-24 NOTE — TELEPHONE ENCOUNTER
Refill Decision Note   Kobi Nadya  is requesting a refill authorization.  Brief Assessment and Rationale for Refill:  Approve     Medication Therapy Plan:         Comments:     Note composed:12:33 AM 03/24/2025

## 2025-03-30 ENCOUNTER — HOSPITAL ENCOUNTER (EMERGENCY)
Facility: HOSPITAL | Age: 80
Discharge: HOME | End: 2025-03-30
Attending: EMERGENCY MEDICINE
Payer: COMMERCIAL

## 2025-03-30 VITALS
RESPIRATION RATE: 14 BRPM | TEMPERATURE: 97 F | OXYGEN SATURATION: 100 % | SYSTOLIC BLOOD PRESSURE: 129 MMHG | WEIGHT: 214.19 LBS | HEIGHT: 73 IN | HEART RATE: 56 BPM | BODY MASS INDEX: 28.39 KG/M2 | DIASTOLIC BLOOD PRESSURE: 70 MMHG

## 2025-03-30 DIAGNOSIS — M94.0 COSTOCHONDRITIS: Primary | ICD-10-CM

## 2025-03-30 DIAGNOSIS — R07.9 CHEST PAIN: ICD-10-CM

## 2025-03-30 LAB
ABSOLUTE EOSINOPHIL (OHS): 0.18 K/UL
ABSOLUTE MONOCYTE (OHS): 0.45 K/UL (ref 0.3–1)
ABSOLUTE NEUTROPHIL COUNT (OHS): 1.91 K/UL (ref 1.8–7.7)
ALBUMIN SERPL BCP-MCNC: 3.8 G/DL (ref 3.5–5.2)
ALP SERPL-CCNC: 58 UNIT/L (ref 40–150)
ALT SERPL W/O P-5'-P-CCNC: 15 UNIT/L (ref 10–44)
ANION GAP (OHS): 8 MMOL/L (ref 8–16)
AST SERPL-CCNC: 17 UNIT/L (ref 11–45)
BASOPHILS # BLD AUTO: 0.03 K/UL
BASOPHILS NFR BLD AUTO: 0.8 %
BILIRUB SERPL-MCNC: 0.4 MG/DL (ref 0.1–1)
BNP SERPL-MCNC: 23 PG/ML (ref 0–99)
BUN SERPL-MCNC: 13 MG/DL (ref 8–23)
CALCIUM SERPL-MCNC: 8.9 MG/DL (ref 8.7–10.5)
CHLORIDE SERPL-SCNC: 107 MMOL/L (ref 95–110)
CO2 SERPL-SCNC: 24 MMOL/L (ref 23–29)
CREAT SERPL-MCNC: 0.9 MG/DL (ref 0.5–1.4)
ERYTHROCYTE [DISTWIDTH] IN BLOOD BY AUTOMATED COUNT: 11.2 % (ref 11.5–14.5)
GFR SERPLBLD CREATININE-BSD FMLA CKD-EPI: >60 ML/MIN/1.73/M2
GLUCOSE SERPL-MCNC: 97 MG/DL (ref 70–110)
HCT VFR BLD AUTO: 38.2 % (ref 40–54)
HGB BLD-MCNC: 12.8 GM/DL (ref 14–18)
IMM GRANULOCYTES # BLD AUTO: 0.01 K/UL (ref 0–0.04)
IMM GRANULOCYTES NFR BLD AUTO: 0.3 % (ref 0–0.5)
LYMPHOCYTES # BLD AUTO: 1.23 K/UL (ref 1–4.8)
MCH RBC QN AUTO: 30.8 PG (ref 27–50)
MCHC RBC AUTO-ENTMCNC: 33.5 G/DL (ref 32–36)
MCV RBC AUTO: 92 FL (ref 82–98)
NUCLEATED RBC (/100WBC) (OHS): 0 /100 WBC
OHS QRS DURATION: 74 MS
OHS QTC CALCULATION: 401 MS
PLATELET # BLD AUTO: 211 K/UL (ref 150–450)
PMV BLD AUTO: 8.9 FL (ref 9.2–12.9)
POTASSIUM SERPL-SCNC: 4 MMOL/L (ref 3.5–5.1)
PROT SERPL-MCNC: 7.2 GM/DL (ref 6–8.4)
RBC # BLD AUTO: 4.16 M/UL (ref 4.6–6.2)
RELATIVE EOSINOPHIL (OHS): 4.7 %
RELATIVE LYMPHOCYTE (OHS): 32.3 % (ref 18–48)
RELATIVE MONOCYTE (OHS): 11.8 % (ref 4–15)
RELATIVE NEUTROPHIL (OHS): 50.1 % (ref 38–73)
SODIUM SERPL-SCNC: 139 MMOL/L (ref 136–145)
TROPONIN I SERPL DL<=0.01 NG/ML-MCNC: <0.006 NG/ML
WBC # BLD AUTO: 3.81 K/UL (ref 3.9–12.7)

## 2025-03-30 PROCEDURE — 80053 COMPREHEN METABOLIC PANEL: CPT | Mod: ER | Performed by: EMERGENCY MEDICINE

## 2025-03-30 PROCEDURE — 93010 ELECTROCARDIOGRAM REPORT: CPT | Mod: ,,, | Performed by: INTERNAL MEDICINE

## 2025-03-30 PROCEDURE — 94761 N-INVAS EAR/PLS OXIMETRY MLT: CPT | Mod: ER

## 2025-03-30 PROCEDURE — 85025 COMPLETE CBC W/AUTO DIFF WBC: CPT | Mod: ER | Performed by: EMERGENCY MEDICINE

## 2025-03-30 PROCEDURE — 86803 HEPATITIS C AB TEST: CPT | Performed by: EMERGENCY MEDICINE

## 2025-03-30 PROCEDURE — 99285 EMERGENCY DEPT VISIT HI MDM: CPT | Mod: 25,ER

## 2025-03-30 PROCEDURE — 84484 ASSAY OF TROPONIN QUANT: CPT | Mod: ER | Performed by: EMERGENCY MEDICINE

## 2025-03-30 PROCEDURE — 83880 ASSAY OF NATRIURETIC PEPTIDE: CPT | Mod: ER | Performed by: EMERGENCY MEDICINE

## 2025-03-30 PROCEDURE — 87389 HIV-1 AG W/HIV-1&-2 AB AG IA: CPT | Performed by: EMERGENCY MEDICINE

## 2025-03-30 PROCEDURE — 93005 ELECTROCARDIOGRAM TRACING: CPT | Mod: ER

## 2025-03-30 PROCEDURE — 25000003 PHARM REV CODE 250: Mod: ER | Performed by: EMERGENCY MEDICINE

## 2025-03-30 RX ORDER — PREDNISONE 10 MG/1
10 TABLET ORAL DAILY
COMMUNITY
Start: 2025-03-27

## 2025-03-30 RX ORDER — ASPIRIN 325 MG
325 TABLET ORAL
Status: COMPLETED | OUTPATIENT
Start: 2025-03-30 | End: 2025-03-30

## 2025-03-30 RX ORDER — NAPROXEN 500 MG/1
500 TABLET ORAL 2 TIMES DAILY WITH MEALS
Qty: 20 TABLET | Refills: 0 | Status: SHIPPED | OUTPATIENT
Start: 2025-03-30

## 2025-03-30 RX ADMIN — ASPIRIN 325 MG: 325 TABLET ORAL at 10:03

## 2025-03-30 NOTE — ED PROVIDER NOTES
Encounter Date: 3/30/2025       History     Chief Complaint   Patient presents with    Chest Pain     Left anterior chest pain for the past four days. Reports that the pain is constant but varies in intensity      The history is provided by the patient.   Chest Pain  Illness onset: pt states pain has been for past several several days, worse today. Duration of episode(s) is 4 days. Chest pain occurs constantly. The chest pain is unchanged. Associated with: pain with palpation in left lower chest area, reproduces pt's complaint. Pain scale at highest: mild. Pain scale currently: mild. The quality of the pain is described as aching, dull and stabbing. The pain does not radiate. Pertinent negatives for primary symptoms include no fever, no fatigue, no syncope, no shortness of breath, no cough, no wheezing, no palpitations, no abdominal pain, no nausea, no vomiting, no dizziness and no altered mental status.   Pertinent negatives for associated symptoms include no weakness.   Procedure history is negative for cardiac catheterization, echocardiogram, persantine thallium, stress echo, stress thallium, exercise treadmill test and coronary CTA.     Review of patient's allergies indicates:  No Known Allergies  Past Medical History:   Diagnosis Date    Arthritis     Essential (primary) hypertension     Myasthenia gravis      Past Surgical History:   Procedure Laterality Date    ROTATOR CUFF REPAIR Left      No family history on file.  Social History[1]  Review of Systems   Constitutional:  Negative for fatigue and fever.   HENT:  Negative for sore throat.    Respiratory:  Negative for cough, shortness of breath and wheezing.    Cardiovascular:  Positive for chest pain. Negative for palpitations and syncope.   Gastrointestinal:  Negative for abdominal pain, nausea and vomiting.   Genitourinary:  Negative for dysuria.   Musculoskeletal:  Negative for back pain.   Skin:  Negative for rash.   Neurological:  Negative for dizziness  and weakness.   Hematological:  Does not bruise/bleed easily.   All other systems reviewed and are negative.      Physical Exam     Initial Vitals [03/30/25 0954]   BP Pulse Resp Temp SpO2   (!) 165/98 64 16 97.4 °F (36.3 °C) 100 %      MAP       --         Physical Exam    Nursing note and vitals reviewed.  Constitutional: He appears well-developed and well-nourished. He is not diaphoretic. No distress.   HENT:   Head: Normocephalic and atraumatic.   Eyes: EOM are normal. Pupils are equal, round, and reactive to light. No scleral icterus.   Neck: Neck supple. No thyromegaly present.   Normal range of motion.  Cardiovascular:  Normal rate, regular rhythm, normal heart sounds and intact distal pulses.     Exam reveals no gallop and no friction rub.       No murmur heard.  Pulmonary/Chest: Breath sounds normal. No respiratory distress. He has no wheezes. He has no rhonchi. He exhibits tenderness (in area diagrammed, reproduces pt's complaint).     Abdominal: Abdomen is soft. Bowel sounds are normal. He exhibits no distension. There is no abdominal tenderness. There is no rebound and no guarding.   Musculoskeletal:         General: No tenderness or edema. Normal range of motion.      Cervical back: Normal range of motion and neck supple.     Lymphadenopathy:     He has no cervical adenopathy.   Neurological: He is alert and oriented to person, place, and time. He has normal strength. No cranial nerve deficit or sensory deficit. GCS score is 15. GCS eye subscore is 4. GCS verbal subscore is 5. GCS motor subscore is 6.   No acute focal neuro deficits   Skin: Skin is warm and dry. Capillary refill takes less than 2 seconds.   Psychiatric: He has a normal mood and affect. His behavior is normal. Judgment and thought content normal.         ED Course   Procedures  Labs Reviewed   CBC WITH DIFFERENTIAL - Abnormal       Result Value    WBC 3.81 (*)     RBC 4.16 (*)     HGB 12.8 (*)     HCT 38.2 (*)     MCV 92      MCH 30.8       MCHC 33.5      RDW 11.2 (*)     Platelet Count 211      MPV 8.9 (*)     Nucleated RBC 0      Neut % 50.1      Lymph % 32.3      Mono % 11.8      Eos % 4.7      Basophil % 0.8      Imm Grans % 0.3      Neut # 1.91      Lymph # 1.23      Mono # 0.45      Eos # 0.18      Baso # 0.03      Imm Grans # 0.01     COMPREHENSIVE METABOLIC PANEL - Normal    Sodium 139      Potassium 4.0      Chloride 107      CO2 24      Glucose 97      BUN 13      Creatinine 0.9      Calcium 8.9      Protein Total 7.2      Albumin 3.8      Bilirubin Total 0.4      ALP 58      AST 17      ALT 15      Anion Gap 8      eGFR >60     TROPONIN I - Normal    Troponin-I <0.006     B-TYPE NATRIURETIC PEPTIDE - Normal    BNP 23     CBC W/ AUTO DIFFERENTIAL    Narrative:     The following orders were created for panel order CBC auto differential.  Procedure                               Abnormality         Status                     ---------                               -----------         ------                     CBC with Differential[[id number]]       Abnormal            Final result                 Please view results for these tests on the individual orders.   HEPATITIS C ANTIBODY   HEP C VIRUS HOLD SPECIMEN   HIV 1 / 2 ANTIBODY   TROPONIN I     EKG Readings: (Independently Interpreted)   Initial Reading: No STEMI. Rhythm: Normal Sinus Rhythm. Heart Rate: 65. Ectopy: No Ectopy. Conduction: Normal. ST Segments: Normal ST Segments. T Waves: Normal. Clinical Impression: Normal Sinus Rhythm       Imaging Results              X-Ray Chest AP Portable (Final result)  Result time 03/30/25 10:13:10      Final result by Troy Jones MD (03/30/25 10:13:10)                   Impression:      1.  Negative for acute process involving the chest.    2.  Stable findings as noted above.      Electronically signed by: Troy Jones MD  Date:    03/30/2025  Time:    10:13               Narrative:    EXAMINATION:  XR CHEST AP PORTABLE    CLINICAL  "HISTORY:  Chest Pain;    COMPARISON:  February 16, 2023    FINDINGS:  EKG leads overlie the chest, which is rotated to the left.  The lungs are clear. The cardiac silhouette size is normal. The trachea is midline and the mediastinal width is normal. Negative for focal infiltrate, effusion or pneumothorax. Pulmonary vasculature is normal. Negative for osseous abnormalities. Tortuous aorta with calcifications of the aortic knob.  Marginal spondylosis again seen.                                       Medications   aspirin tablet 325 mg (325 mg Oral Given 3/30/25 1016)     Medical Decision Making  Amount and/or Complexity of Data Reviewed  Labs: ordered. Decision-making details documented in ED Course.  Radiology: ordered and independent interpretation performed. Decision-making details documented in ED Course.  ECG/medicine tests: ordered and independent interpretation performed. Decision-making details documented in ED Course.    Risk  OTC drugs.  Prescription drug management.  Parenteral controlled substances.  Risk Details: OTC drugs, prescription drugs and controlled substances considered.  Due to patient's symptoms improving and pain controlled pain medications ordered appropriately.  DDX: MI, CAD, PUlmonary disease, PE, AAA, Pneumonia, Costochondritis, PTX, Liver disease among others                 ED Course as of 03/30/25 1143   Sun Mar 30, 2025   0955 Rhythm strip reviewed.  Nsr, no stemi. 65 bpm. [LV]      ED Course User Index  [LV] Berto Wilson Jr., MD       Vitals:    03/30/25 0954 03/30/25 0955 03/30/25 0956 03/30/25 1034   BP: (!) 165/98   135/76   Pulse: 64 61 66 (!) 57   Resp: 16  20 14   Temp: 97.4 °F (36.3 °C)      TempSrc: Oral      SpO2: 100%  100% 100%   Weight: 97.1 kg (214 lb 2.8 oz)      Height: 6' 1" (1.854 m)       03/30/25 1049 03/30/25 1114 03/30/25 1119 03/30/25 1134   BP: (!) 145/80  (!) 149/79 129/70   Pulse: (!) 57 (!) 54  (!) 56   Resp: 19 18  14   Temp:       TempSrc:       SpO2: " 100% 100% 100% 100%   Weight:       Height:           Results for orders placed or performed during the hospital encounter of 03/30/25   Comprehensive metabolic panel    Collection Time: 03/30/25 10:10 AM   Result Value Ref Range    Sodium 139 136 - 145 mmol/L    Potassium 4.0 3.5 - 5.1 mmol/L    Chloride 107 95 - 110 mmol/L    CO2 24 23 - 29 mmol/L    Glucose 97 70 - 110 mg/dL    BUN 13 8 - 23 mg/dL    Creatinine 0.9 0.5 - 1.4 mg/dL    Calcium 8.9 8.7 - 10.5 mg/dL    Protein Total 7.2 6.0 - 8.4 gm/dL    Albumin 3.8 3.5 - 5.2 g/dL    Bilirubin Total 0.4 0.1 - 1.0 mg/dL    ALP 58 40 - 150 unit/L    AST 17 11 - 45 unit/L    ALT 15 10 - 44 unit/L    Anion Gap 8 8 - 16 mmol/L    eGFR >60 >60 mL/min/1.73/m2   Troponin I #1    Collection Time: 03/30/25 10:10 AM   Result Value Ref Range    Troponin-I <0.006 <=0.026 ng/mL   BNP    Collection Time: 03/30/25 10:10 AM   Result Value Ref Range    BNP 23 0 - 99 pg/mL   CBC with Differential    Collection Time: 03/30/25 10:10 AM   Result Value Ref Range    WBC 3.81 (L) 3.90 - 12.70 K/uL    RBC 4.16 (L) 4.60 - 6.20 M/uL    HGB 12.8 (L) 14.0 - 18.0 gm/dL    HCT 38.2 (L) 40.0 - 54.0 %    MCV 92 82 - 98 fL    MCH 30.8 27.0 - 50.0 pg    MCHC 33.5 32.0 - 36.0 g/dL    RDW 11.2 (L) 11.5 - 14.5 %    Platelet Count 211 150 - 450 K/uL    MPV 8.9 (L) 9.2 - 12.9 fL    Nucleated RBC 0 <=0 /100 WBC    Neut % 50.1 38 - 73 %    Lymph % 32.3 18 - 48 %    Mono % 11.8 4 - 15 %    Eos % 4.7 <=8 %    Basophil % 0.8 <=1.9 %    Imm Grans % 0.3 0.0 - 0.5 %    Neut # 1.91 1.8 - 7.7 K/uL    Lymph # 1.23 1 - 4.8 K/uL    Mono # 0.45 0.3 - 1 K/uL    Eos # 0.18 <=0.5 K/uL    Baso # 0.03 <=0.2 K/uL    Imm Grans # 0.01 0.00 - 0.04 K/uL         Imaging Results              X-Ray Chest AP Portable (Final result)  Result time 03/30/25 10:13:10      Final result by Troy Jones MD (03/30/25 10:13:10)                   Impression:      1.  Negative for acute process involving the chest.    2.  Stable findings as  noted above.      Electronically signed by: Troy Jones MD  Date:    03/30/2025  Time:    10:13               Narrative:    EXAMINATION:  XR CHEST AP PORTABLE    CLINICAL HISTORY:  Chest Pain;    COMPARISON:  February 16, 2023    FINDINGS:  EKG leads overlie the chest, which is rotated to the left.  The lungs are clear. The cardiac silhouette size is normal. The trachea is midline and the mediastinal width is normal. Negative for focal infiltrate, effusion or pneumothorax. Pulmonary vasculature is normal. Negative for osseous abnormalities. Tortuous aorta with calcifications of the aortic knob.  Marginal spondylosis again seen.                                      Medications   aspirin tablet 325 mg (325 mg Oral Given 3/30/25 1016)       11:41 AM - Re-evaluation: The patient is resting comfortably and is in no acute distress. He states that his symptoms have improved after treatment within ER. Discussed test results, shared treatment plan, specific conditions for return, and importance of follow up with patient and family.  Advised close f/u c pcp/cardiology within one week and to return to ER if any issues arise.  He understands and agrees with the plan as discussed. Answered  his questions at this time. He has remained hemodynamically stable throughout the ED course and is appropriate for discharge home.     Regarding CHEST PAIN, I advised the patient that chest pain can be caused by a range of conditions, from not serious to life-threatening such as: heart attack or a blood clot in your lungs, angina indicating poor blood flow to the heart; infection, inflammation, or a fracture in the bones or cartilage in chest wall; a digestive problem, such as acid reflux or a stomach ulcer; or even an anxiety attack.  Instructed patient to follow up with primary healthcare provider for reevaluation and possible diagnostic studies to find the actual cause of the chest pain. Patient was instructed to call 911 or go to the  nearest emergency department if they develop any of the following signs of a heart attack: squeezing, pressure, or pain in the chest that lasts longer than five minutes or returns; discomfort or pain in the back, neck, jaw, stomach, or arm; trouble breathing; nausea or vomiting; lightheadedness;  or a sudden cold sweat, especially with chest pain or trouble breathing.  Also return to the emergency department the chest discomfort gets worse (even with medicine); cough or vomit blood; have bowel movements that are black or bloody; cannot stop vomiting; or develop difficulty swallowing.      Pre-hypertension/Hypertension: The pt has been informed that they may have pre-hypertension or hypertension based on a blood pressure reading in the ED. I recommend that the pt call the PCP listed on their discharge instructions or a physician of their choice this week to arrange f/u for further evaluation of possible pre-hypertension or hypertension.     Kobi Covington was given a handout which discussed their disease process, precautions, and instructions for follow-up and therapy.     Follow-up Information       Rashi Barbosa MD. Schedule an appointment as soon as possible for a visit in 1 week.    Specialties: Internal Medicine, Family Medicine  Contact information:  [address] [address] [address]  [phone]               [facility]. Schedule an appointment as soon as possible for a visit in 1 week.    Specialty: Cardiology  Why: or with your cardiologist  Contact information:  [address] [facility] [address] [phone]  Additional information:  Please park on the [address] side and use the Clinic entrance. Check in on the 3rd floor, to the right.             [place] - Emergency Dept.    Specialty: Emergency Medicine  Why: As needed, If symptoms worsen  Contact information:  [address] [facility]  Emergency Department  [facility] [address] [phone]                               Medication List        START taking these medications      naproxen 500 MG tablet  Commonly known as: NAPROSYN  Take 1 tablet (500 mg total) by mouth 2 (two) times daily with meals.            ASK your doctor about these medications      cyanocobalamin 1,000 mcg/mL injection  Inject 1 mL (1,000 mcg total) into the muscle every 30 days.     DULoxetine 30 MG capsule  Commonly known as: CYMBALTA  Take 1 capsule (30 mg total) by mouth once daily.     lisinopriL-hydrochlorothiazide 20-12.5 mg per tablet  Commonly known as: PRINZIDE,ZESTORETIC  Take 1 tablet by mouth once daily.     predniSONE 10 MG tablet  Commonly known as: DELTASONE  Ask about: Which instructions should I use?               Where to Get Your Medications        You can get these medications from any pharmacy    Bring a paper prescription for each of these medications  naproxen 500 MG tablet        Current Discharge Medication List            ED Diagnosis  1. Costochondritis    2. Chest pain                            Clinical Impression:  Final diagnoses:  [R07.9] Chest pain  [M94.0] Costochondritis (Primary)          ED Disposition Condition    Discharge Stable          ED Prescriptions       Medication Sig Dispense Start Date End Date Auth. Provider    naproxen (NAPROSYN) 500 MG tablet Take 1 tablet (500 mg total) by mouth 2 (two) times daily with meals. 20 tablet 3/30/2025 -- Berto Wilson Jr., MD          Follow-up Information       Follow up With Specialties Details Why Contact Info Additional Information    Rashi Barbosa MD Internal Medicine, Family Medicine Schedule an appointment as soon as possible for a visit in 1 week  [address] [address] [address]  [phone]       [facility] Schedule an appointment as soon as possible for a visit in 1 week or with your cardiologist [id number] [facility] [address] [phone] Please park on the [address] side and use the  Clinic entrance. Check in on the 3rd floor, to the right.    [place] - Emergency Dept Emergency Medicine  As needed, If symptoms worsen [address]  Emergency Department  [facility] [id number] [phone]                  [1]   Social History  Tobacco Use    Smoking status: Former     Types: Cigarettes     Passive exposure: Past    Smokeless tobacco: Never   Substance Use Topics    Alcohol use: Yes     Comment: occasional beer    Drug use: No        Berto Wilson Jr., MD  03/30/25 1142

## 2025-03-31 LAB
HCV AB SERPL QL IA: NEGATIVE
HIV 1+2 AB+HIV1 P24 AG SERPL QL IA: NEGATIVE

## 2025-04-05 DIAGNOSIS — M54.17 LUMBOSACRAL RADICULOPATHY: ICD-10-CM

## 2025-04-05 NOTE — TELEPHONE ENCOUNTER
No care due was identified.  [facility] Embedded Care Due Messages. Reference number: [id number].   4/05/2025 4:01:05 AM CDT

## 2025-04-07 DIAGNOSIS — M54.17 LUMBOSACRAL RADICULOPATHY: ICD-10-CM

## 2025-04-07 RX ORDER — DULOXETIN HYDROCHLORIDE 30 MG/1
CAPSULE, DELAYED RELEASE ORAL
Qty: 90 CAPSULE | Refills: 0 | OUTPATIENT
Start: 2025-04-07

## 2025-04-07 RX ORDER — DULOXETIN HYDROCHLORIDE 30 MG/1
30 CAPSULE, DELAYED RELEASE ORAL DAILY
Qty: 90 CAPSULE | Refills: 0 | Status: SHIPPED | OUTPATIENT
Start: 2025-04-07 | End: 2025-07-06

## 2025-04-07 NOTE — TELEPHONE ENCOUNTER
No care due was identified.  [facility] Embedded Care Due Messages. Reference number: [id number].   4/07/2025 3:10:56 PM CDT

## 2025-05-19 ENCOUNTER — OFFICE VISIT (OUTPATIENT)
Dept: ORTHOPEDICS | Facility: CLINIC | Age: 80
End: 2025-05-19
Payer: COMMERCIAL

## 2025-05-19 ENCOUNTER — HOSPITAL ENCOUNTER (OUTPATIENT)
Dept: RADIOLOGY | Facility: HOSPITAL | Age: 80
Discharge: HOME | End: 2025-05-19
Attending: STUDENT IN AN ORGANIZED HEALTH CARE EDUCATION/TRAINING PROGRAM
Payer: COMMERCIAL

## 2025-05-19 VITALS — WEIGHT: 214.06 LBS | HEIGHT: 73 IN | BODY MASS INDEX: 28.37 KG/M2

## 2025-05-19 DIAGNOSIS — G56.01 RIGHT CARPAL TUNNEL SYNDROME: ICD-10-CM

## 2025-05-19 DIAGNOSIS — G56.01 CARPAL TUNNEL SYNDROME OF RIGHT WRIST: Primary | ICD-10-CM

## 2025-05-19 DIAGNOSIS — M79.641 BILATERAL HAND PAIN: ICD-10-CM

## 2025-05-19 DIAGNOSIS — M79.642 BILATERAL HAND PAIN: Primary | ICD-10-CM

## 2025-05-19 DIAGNOSIS — G56.02 CARPAL TUNNEL SYNDROME OF LEFT WRIST: ICD-10-CM

## 2025-05-19 DIAGNOSIS — M79.642 BILATERAL HAND PAIN: ICD-10-CM

## 2025-05-19 DIAGNOSIS — M79.641 BILATERAL HAND PAIN: Primary | ICD-10-CM

## 2025-05-19 PROCEDURE — 1125F AMNT PAIN NOTED PAIN PRSNT: CPT | Mod: CPTII,S$GLB,, | Performed by: STUDENT IN AN ORGANIZED HEALTH CARE EDUCATION/TRAINING PROGRAM

## 2025-05-19 PROCEDURE — 3288F FALL RISK ASSESSMENT DOCD: CPT | Mod: CPTII,S$GLB,, | Performed by: STUDENT IN AN ORGANIZED HEALTH CARE EDUCATION/TRAINING PROGRAM

## 2025-05-19 PROCEDURE — 73130 X-RAY EXAM OF HAND: CPT | Mod: TC,50

## 2025-05-19 PROCEDURE — 1101F PT FALLS ASSESS-DOCD LE1/YR: CPT | Mod: CPTII,S$GLB,, | Performed by: STUDENT IN AN ORGANIZED HEALTH CARE EDUCATION/TRAINING PROGRAM

## 2025-05-19 PROCEDURE — 1160F RVW MEDS BY RX/DR IN RCRD: CPT | Mod: CPTII,S$GLB,, | Performed by: STUDENT IN AN ORGANIZED HEALTH CARE EDUCATION/TRAINING PROGRAM

## 2025-05-19 PROCEDURE — 1159F MED LIST DOCD IN RCRD: CPT | Mod: CPTII,S$GLB,, | Performed by: STUDENT IN AN ORGANIZED HEALTH CARE EDUCATION/TRAINING PROGRAM

## 2025-05-19 PROCEDURE — 99215 OFFICE O/P EST HI 40 MIN: CPT | Mod: S$GLB,,, | Performed by: STUDENT IN AN ORGANIZED HEALTH CARE EDUCATION/TRAINING PROGRAM

## 2025-05-19 PROCEDURE — 73130 X-RAY EXAM OF HAND: CPT | Mod: 26,,, | Performed by: RADIOLOGY

## 2025-05-19 PROCEDURE — 99999 PR PBB SHADOW E&M-EST. PATIENT-LVL V: CPT | Mod: PBBFAC,,, | Performed by: STUDENT IN AN ORGANIZED HEALTH CARE EDUCATION/TRAINING PROGRAM

## 2025-05-19 RX ORDER — SODIUM CHLORIDE 9 MG/ML
INJECTION, SOLUTION INTRAVENOUS CONTINUOUS
OUTPATIENT
Start: 2025-05-19

## 2025-05-19 RX ORDER — CEFAZOLIN SODIUM 2 G/50ML
2 SOLUTION INTRAVENOUS
OUTPATIENT
Start: 2025-05-19

## 2025-05-19 RX ORDER — LIDOCAINE HYDROCHLORIDE 10 MG/ML
1 INJECTION, SOLUTION EPIDURAL; INFILTRATION; INTRACAUDAL; PERINEURAL ONCE
OUTPATIENT
Start: 2025-05-19 | End: 2025-05-19

## 2025-05-19 NOTE — H&P (VIEW-ONLY)
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  He has previously been diagnosed with bilateral carpal tunnel syndrome.  He has undergone electrodiagnostic testing in the past, the results of which are reviewed below.  He underwent bilateral carpal tunnel steroid injections in March of 2024 by myself and in February of 2025 by Dr. Goldman.  He has pain and numbness which wakes him up from sleep at night.  His pain level is a 7/10.  In the right-hand, he has numbness in the thumb, index, and middle fingers all the time.  He has a history of type 2 diabetes with a most recent hemoglobin A1c of 5.9 on 05/21/2024.    Review of Systems  Review of systems negative for chest pain, shortness of breath, fevers, chills, nausea/vomiting.    Past Medical History  Past Medical History:   Diagnosis Date    Arthritis     Essential (primary) hypertension     Myasthenia gravis        Past Surgical History  Past Surgical History:   Procedure Laterality Date    ROTATOR CUFF REPAIR Left        Allergies  Review of patient's allergies indicates:  No Known Allergies    Family History  No family history on file.    Social History  Social History[1]    Vital Signs  There were no vitals filed for this visit.    Physical Exam  Constitutional: Appears well-developed and well-nourished. No distress.   HENT:   Head: Normocephalic.   Eyes: EOM are normal.   Pulmonary/Chest: Effort normal.   Neurological: Oriented to person, place, and time.   Psychiatric: Normal mood and affect.     Right Upper Extremity:  No abrasions, lacerations, wounds.  No swelling.  No erythema.  Full active wrist flexion and extension.  Full pronation and supination. Positive Tinel's in the median nerve distribution at the wrist.  Positive Phalen's.  Negative Tinel's  in the ulnar nerve distribution at the elbow.  No ulnar nerve subluxation with elbow flexion.  Negative elbow flexion test. No thenar or FDI atrophy.  5/5 apb strength.  5/5 FDI strength.  Two-point discrimination: >10/>10/8/5/5.  Patient is able to make a fist and extend all his fingers.  No tenderness over the A1 pulleys of all 5 fingers.  No triggering with range of motion.  Palpable radial pulse.    Left Upper Extremity:  No abrasions, lacerations, wounds.  No swelling.  No erythema.  Active wrist flexion to 40° and extension to 30°.  Full pronation and supination. Positive Tinel's in the median nerve distribution at the wrist.  Positive Phalen's.  Negative Tinel's in the ulnar nerve distribution at the elbow.  No ulnar nerve subluxation with elbow flexion.  Negative elbow flexion test. No thenar or FDI atrophy.  5/5 apb strength.  5/5 FDI strength.  Two-point discrimination is 5 mm in all 5 fingers.  Patient is able to make a fist and extend all her fingers.  No tenderness over the A1 pulleys of all 5 fingers.  No triggering with range of motion.  Palpable radial pulse.      Imaging  Bilateral hand x-rays three views were obtained today and independently reviewed by myself.  Severe arthritic changes are noted at the bilateral thumb CMC joints as well as the bilateral thumb IP joints, with complete loss of joint space and osteophyte formation.  Arthritic changes are noted at the DIPJ joints of the lesser fingers bilaterally.  No fracture.  No dislocation or subluxation.  No foreign body.  Patient is noted to have a left SLAC wrist, severe, with subchondral cyst formation in the distal radius.    Electrodiagnostic studies were reviewed by me today that were performed on 3/1/24. The Left median DSL was 4.3 ms and the right showed no response. The Left median DML was 4.2 ms and the right was 6.6 ms. Left Ulnar velocity across elbow was 56 m/s and Right was 50 m/s.  EMG showed 1+ polys and slightly reduced  recruitment pattern at right FDI, right APB, right deltoid, right brachioradialis, left FDI and left brachioradialis.  Performing physician interpretation:  There is electrodiagnostic evidence of a severe demyelinating and axonal median neuropathy (Carpal tunnel syndrome) across the right wrist and a mild demyelinating CTS across the left wrist. There is an acute on chronic radiculopathy of the left C8, T1 nerve roots and a chronic radiculopathy of the right C8,T1 and bilateral C5 nerve roots     Assessment and Plan  80-year-old male presents for follow up.  He has bilateral carpal tunnel syndrome, severe on the right with loss of two-point discrimination in the median nerve distribution.    A discussion was had with the patient about carpal tunnel syndrome.  It was noted that the primary mechanism of etiology is probably a genetic one.  Signs and symptoms of median nerve compression at the carpal tunnel was reviewed and a wide variety of treatment options were discussed.  Treatment options were noted to include use of splint at night, use of non-steroid anti-inflammatory medication, steroid injection, and surgery.  It was noted that electrodiagnostic (nerve conduction velocity and electromyograph) are often done to assess severity of carpal tunnel syndrome, but that the results of these tests are subject to a wide range of variables and must only be considered within the context of the clinical presentation (signs and symptoms).  Electrodiagnostic studies, however, are valuable to help identify any other possible sources of nerve pathology that could account for some or all the clinical presentation.  It was noted that steroid injections have excellent diagnostic value and often are therapeutic as well, although the duration of therapeutic improvement from any given injection is uncertain.  It was also noted that surgery has the best outcomes with respect to degree of improvement and duration of improvement, and  surgery also can also prevent the development of irreversible thenar muscle atrophy due to median nerve compression that has been present for a long time.  Surgery can also prevent the worsening of thenar atrophy but generally does not cause it to improve.   It was noted that if non-surgical treatment wears off, the patient should make a follow-up appointment to be re-evaluated.    Risks and benefits of carpal tunnel release surgery were discussed. In particular, it was noted that carpal tunnel release surgery outcomes can be unpredictable.  Specifically, it was noted that numbness in the fingers may never resolve or may take more than a year to achieve gradual (and often incomplete) improvement.  It was noted that sometimes the only benefit of the surgery is to prevent significant worsening of weakness or symptoms of nerve compression.  It was noted that any muscular weakness or atrophy due to long-standing carpal tunnel syndrome is generally expected not to improve after the surgery.  It was noted that sometimes re-operation is needed to address new symptoms after the surgery or complications such as damage to normal structures within the field of surgery.  It was noted that normal structures within the field of surgery that can be damaged include the median nerve and the nine flexor tendons to the fingers.    I discussed my concern that patient has lost two-point discrimination in the median nerve distribution in the right-hand concerning for progression to permanent nerve damage.  I have recommended consideration of carpal tunnel release surgery to address these issues.  He has had 2 injections previously.  I discussed that the surgery will prevent for the numbness from getting worse.  It will take up to a year to see the full extent of improvement in the numbness following surgery.  Sometimes the carpal tunnel is so severe prior to surgery that the numbness does not fully improve following surgery.  Patient  understands and would like to proceed.  We will plan for right carpal tunnel release on 05/29/2025 followed by left carpal tunnel release on 06/19/2025, 3 weeks later.  Consent was obtained for both procedures.  Anesthetic options were discussed and patient elected to proceed with local only.    Judi Barajas MD  Orthopaedic Hand Surgery         [1]   Social History  Socioeconomic History    Marital status:    Tobacco Use    Smoking status: Former     Types: Cigarettes     Passive exposure: Past    Smokeless tobacco: Never   Substance and Sexual Activity    Alcohol use: Yes     Comment: occasional beer    Drug use: No

## 2025-05-22 ENCOUNTER — PATIENT MESSAGE (OUTPATIENT)
Dept: PREADMISSION TESTING | Facility: HOSPITAL | Age: 80
End: 2025-05-22
Payer: COMMERCIAL

## 2025-05-26 RX ORDER — MELOXICAM 15 MG/1
15 TABLET ORAL
COMMUNITY
Start: 2025-04-22

## 2025-05-28 ENCOUNTER — TELEPHONE (OUTPATIENT)
Dept: PREADMISSION TESTING | Facility: HOSPITAL | Age: 80
End: 2025-05-28
Payer: COMMERCIAL

## 2025-05-28 NOTE — TELEPHONE ENCOUNTER
Called and spoke with the Pt about the following:      Your Surgery arrival time is at 12:00 pm on 5/29/25 at [facility].   The address is [address]. [place] [address].       *If you are running late or have questions the morning of surgery, please call the Pre-OP Department @ [phone].     Do not eat or drink after 12 midnight, including water. Do not smoke or use chewing tobacco after 12 midnight!  OK to brush teeth, but no gum, candy, or mints!      Additional Instructions:  You may be required to provide a urine sample prior to procedure;   Please ask  for a specimen cup if you need to use the restroom prior to being called into pre-op.     Please come to the main lobby and be prepared to show your photo ID and insurance card.       Only take specific medications discussed with the Pre-Admit Nurse.      Please call with any questions or concerns ([phone] or [phone])    [facility] Visitor/Ride Policy:   Adults:  Only 1 adult allowed (must be 18 or older) to accompany you and MUST STAY through the entire length of admission.     -Must have a ride home from a responsible adult that you know and trust.    -Medical Transport, Uber or Lyft can only be used if patient has a responsible adult to accompany them during ride home.    Pediatrics:  Pediatric patients are encouraged to have 2 adults (must be 18 or older) accompany them to the surgery center.   ~If the parent/legal guardian is unable to stay for the duration of the surgery time,   you MUST have someone over the age of 18 able to stay with the patient until time of discharge.     ~The parent/legal guardian must be available to be reached by phone at all times if they are unable to stay with the patient.

## 2025-05-29 ENCOUNTER — HOSPITAL ENCOUNTER (OUTPATIENT)
Facility: HOSPITAL | Age: 80
Discharge: HOME | End: 2025-05-29
Attending: STUDENT IN AN ORGANIZED HEALTH CARE EDUCATION/TRAINING PROGRAM | Admitting: STUDENT IN AN ORGANIZED HEALTH CARE EDUCATION/TRAINING PROGRAM
Payer: COMMERCIAL

## 2025-05-29 VITALS
OXYGEN SATURATION: 98 % | BODY MASS INDEX: 28.37 KG/M2 | SYSTOLIC BLOOD PRESSURE: 173 MMHG | TEMPERATURE: 98 F | WEIGHT: 209.44 LBS | HEIGHT: 72 IN | RESPIRATION RATE: 20 BRPM | HEART RATE: 55 BPM | DIASTOLIC BLOOD PRESSURE: 81 MMHG

## 2025-05-29 DIAGNOSIS — G56.01 RIGHT CARPAL TUNNEL SYNDROME: ICD-10-CM

## 2025-05-29 DIAGNOSIS — G56.01 CARPAL TUNNEL SYNDROME OF RIGHT WRIST: ICD-10-CM

## 2025-05-29 DIAGNOSIS — Z98.890 POSTOPERATIVE STATE: Primary | ICD-10-CM

## 2025-05-29 PROCEDURE — 71000015 HC POSTOP RECOV 1ST HR: Performed by: STUDENT IN AN ORGANIZED HEALTH CARE EDUCATION/TRAINING PROGRAM

## 2025-05-29 PROCEDURE — 36000707: Performed by: STUDENT IN AN ORGANIZED HEALTH CARE EDUCATION/TRAINING PROGRAM

## 2025-05-29 PROCEDURE — 25000003 PHARM REV CODE 250: Performed by: STUDENT IN AN ORGANIZED HEALTH CARE EDUCATION/TRAINING PROGRAM

## 2025-05-29 PROCEDURE — 64721 CARPAL TUNNEL SURGERY: CPT | Mod: RT,,, | Performed by: STUDENT IN AN ORGANIZED HEALTH CARE EDUCATION/TRAINING PROGRAM

## 2025-05-29 PROCEDURE — 36000706: Performed by: STUDENT IN AN ORGANIZED HEALTH CARE EDUCATION/TRAINING PROGRAM

## 2025-05-29 PROCEDURE — 63600175 PHARM REV CODE 636 W HCPCS: Performed by: STUDENT IN AN ORGANIZED HEALTH CARE EDUCATION/TRAINING PROGRAM

## 2025-05-29 RX ORDER — BUPIVACAINE HYDROCHLORIDE 2.5 MG/ML
INJECTION, SOLUTION EPIDURAL; INFILTRATION; INTRACAUDAL; PERINEURAL
Status: DISCONTINUED | OUTPATIENT
Start: 2025-05-29 | End: 2025-05-29 | Stop reason: HOSPADM

## 2025-05-29 RX ORDER — SODIUM CHLORIDE 9 MG/ML
INJECTION, SOLUTION INTRAVENOUS CONTINUOUS
Status: DISCONTINUED | OUTPATIENT
Start: 2025-05-29 | End: 2025-05-29 | Stop reason: HOSPADM

## 2025-05-29 RX ORDER — LIDOCAINE HYDROCHLORIDE 10 MG/ML
INJECTION, SOLUTION EPIDURAL; INFILTRATION; INTRACAUDAL; PERINEURAL
Status: DISCONTINUED | OUTPATIENT
Start: 2025-05-29 | End: 2025-05-29 | Stop reason: HOSPADM

## 2025-05-29 RX ORDER — LIDOCAINE HYDROCHLORIDE 10 MG/ML
INJECTION, SOLUTION EPIDURAL; INFILTRATION; INTRACAUDAL; PERINEURAL
Status: DISCONTINUED
Start: 2025-05-29 | End: 2025-05-29 | Stop reason: HOSPADM

## 2025-05-29 RX ORDER — CEFAZOLIN 2 G/1
INJECTION, POWDER, FOR SOLUTION INTRAMUSCULAR; INTRAVENOUS
Status: DISCONTINUED
Start: 2025-05-29 | End: 2025-05-29 | Stop reason: HOSPADM

## 2025-05-29 RX ORDER — BUPIVACAINE HYDROCHLORIDE 2.5 MG/ML
INJECTION, SOLUTION EPIDURAL; INFILTRATION; INTRACAUDAL; PERINEURAL
Status: DISCONTINUED
Start: 2025-05-29 | End: 2025-05-29 | Stop reason: HOSPADM

## 2025-05-29 RX ORDER — TRAMADOL HYDROCHLORIDE 50 MG/1
50 TABLET, FILM COATED ORAL EVERY 8 HOURS PRN
Qty: 10 EACH | Refills: 0 | Status: SHIPPED | OUTPATIENT
Start: 2025-05-29

## 2025-05-29 RX ORDER — BACITRACIN ZINC 500 UNIT/G
OINTMENT (GRAM) TOPICAL
Status: DISCONTINUED | OUTPATIENT
Start: 2025-05-29 | End: 2025-05-29 | Stop reason: HOSPADM

## 2025-05-29 RX ORDER — LIDOCAINE HYDROCHLORIDE 10 MG/ML
1 INJECTION, SOLUTION EPIDURAL; INFILTRATION; INTRACAUDAL; PERINEURAL ONCE
Status: DISCONTINUED | OUTPATIENT
Start: 2025-05-29 | End: 2025-05-29 | Stop reason: HOSPADM

## 2025-05-29 RX ORDER — LIDOCAINE HYDROCHLORIDE 10 MG/ML
INJECTION, SOLUTION EPIDURAL; INFILTRATION; INTRACAUDAL; PERINEURAL
Status: DISCONTINUED
Start: 2025-05-29 | End: 2025-05-29 | Stop reason: WASHOUT

## 2025-05-29 RX ORDER — CEFAZOLIN 2 G/1
2 INJECTION, POWDER, FOR SOLUTION INTRAMUSCULAR; INTRAVENOUS
Status: COMPLETED | OUTPATIENT
Start: 2025-05-29 | End: 2025-05-29

## 2025-05-29 RX ORDER — ACETAMINOPHEN 500 MG
500 TABLET ORAL EVERY 8 HOURS PRN
Qty: 30 TABLET | Refills: 0 | Status: SHIPPED | OUTPATIENT
Start: 2025-05-29

## 2025-05-29 NOTE — NURSING TRANSFER
Received patient from PETRA Emerson. Patient remains stable on room air. RUE is wrapped in ACE bandage, remains CDI. Discharge instructions provided and reviewed with patient. Patient ready for discharge; getting dressed.

## 2025-05-29 NOTE — OP NOTE
[facility] [address]  Orthopaedic Hand Surgery  Operative Note    SUMMARY     Name: Kobi Covington  YOB: 1945  MRN: [id number]    Date of Procedure: 5/29/2025     Procedure:   57893 Right carpal tunnel release       Surgeons and Role:     * Judi Barajas MD - Primary    Assistant: First Eugenio Assist    Pre-Operative Diagnosis: Carpal tunnel syndrome of right wrist [G56.01]    Post-Operative Diagnosis: Same    Anesthesia: Local    Indication for Procedure:  80 y.o. male presented to clinic with bilateral carpal tunnel syndrome. Risks and benefits of operative versus nonoperative treatment were discussed with the patient and he elected to proceed with surgery.  We will plan for right carpal tunnel release today with left carpal tunnel release scheduled for 3 weeks from now.  Consent was obtained for right carpal tunnel release.     Operative Findings (including complications, if any):  Thickened transverse carpal ligament    Description of Technical Procedures:   The patient was brought to the operating room and laid supine.  A tourniquet was placed at the right forearm and the arm prepped with alcohol/chloraprep and draped in the usual sterile surgical fashion.  Preoperative time out was performed with confirmation of correct patient, side, and site, identification of all personnel, confirmation of administration of preoperative antibiotic, dry prep, and confirmation of all needed equipment.  When this was completed, I proceeded with the surgery.     A skin marker was used to lor the relevant landmarks. 10cc of lidocaine 1% without epinephrine mixed with 0.25% marcaine was injected for local anesthetic.  An esmarch was used to exsanguinate the limb and the tourniquet was raised to 250 mm Hg.  A 15- blade was used to make the incision.  The incision began just distal to the distal wrist crease in line with the radial border of the ring finger. The incision was made through the  skin, subcutaneous tissue, palmar fascia down to the transverse carpal ligament. Under direct visualization, the transverse carpal ligament was identified and incised just radial to the hook of the hamate. I sharply incised the entirety of the transverse carpal ligament distally under direct visualization just past the fat surrounding the superficial palmar arch. I then incised the proximal portion of the transverse carpal ligament to and well across the wrist crease to include the distal forearm fascia.  The ligament was sharply divided with care given to avoiding injury to adjacent neurovascular structures.  I were able to confirm a complete release proximally and distally by direct visualization and by palpation- there were no constrictive elements affecting the nerve.      Tourniquet was let down. Hemostasis was obtained. The wound was copiously irrigated. Incision was closed with 4-0 nylon. Bacitracin, adaptic, 4x4, webril, and coban was placed. All sponge, needle, and instrument counts were correct at the conclusion of the procedure.  The patient was awakened and taken to the recovery room in stable condition.    Estimated Blood Loss (EBL): 3cc           Implants: * No implants in log *    Specimens:   Specimen (24h ago, onward)      None                    Condition: Good    Disposition: PACU - hemodynamically stable.    Attestation: I was present and scrubbed for the key portions of the procedure.    Judi Barajas MD  Orthopaedic Hand Surgery

## 2025-05-29 NOTE — INTERVAL H&P NOTE
The patient has been examined and the H&P has been reviewed:    I concur with the findings and no changes have occurred since H&P was written.    Surgery risks, benefits and alternative options discussed and understood by patient/family.      Judi Barajas MD  Orthopaedic Hand Surgery

## 2025-06-12 ENCOUNTER — OFFICE VISIT (OUTPATIENT)
Dept: ORTHOPEDICS | Facility: CLINIC | Age: 80
End: 2025-06-12
Payer: COMMERCIAL

## 2025-06-12 VITALS — BODY MASS INDEX: 28.37 KG/M2 | WEIGHT: 209.44 LBS | HEIGHT: 72 IN

## 2025-06-12 DIAGNOSIS — Z98.890 POSTOPERATIVE STATE: Primary | ICD-10-CM

## 2025-06-12 PROCEDURE — 99999 PR PBB SHADOW E&M-EST. PATIENT-LVL III: CPT | Mod: PBBFAC,,, | Performed by: STUDENT IN AN ORGANIZED HEALTH CARE EDUCATION/TRAINING PROGRAM

## 2025-06-12 NOTE — PROGRESS NOTES
Hand Surgery Clinic Postop Note    Surgery Date: 5/29/25  Surgery:  Right carpal tunnel release    Postoperative Course:  80-year-old male presents for follow up.  He is 2 weeks status post the above procedure.  Overall, doing great.  Pain level is a 0/10.  The pain which previously woke him up from sleep at night is now completely resolve.  He kept his dressing clean and dry following surgery.  He notes that he does have some persistent numbness in the thumb and index finger.  He feels that the numbness in the other fingers is improved.  No fevers or chills.  No other issues.    Vital Signs  There were no vitals filed for this visit.    Physical Exam  General - NAD  Resp - nonlabored breathing  CV - RR by RP    Right Upper Extremity:  Incision is nicely healed.  Nylon suture in place.  No swelling.  No erythema.  No drainage.  No ecchymosis.  Patient is able to make a fist and extend all his fingers.  Full active wrist flexion and extension.  Fires APB.  Two-point discrimination: >10/>10/6/5/5.  Fingers are warm with brisk capillary refill.  Palpable radial pulse.    Imaging  No new imaging obtained today.    Assessment and Plan  80-year-old male who is 2 weeks status post right carpal tunnel release.  Doing well.  The nerve compression pain is resolved.  His incision is nicely healed.  Sutures were removed in clinic today.  Okay to shower.  No activity restrictions.  Discussed scar massage.  He does have some persistent numbness in the median nerve distribution.  I discussed that he had very severe carpal tunnel previously and it will take up to a year to see the full extent of improvement in the numbness following surgery.  Patient may have some numbness long term due to permanent nerve damage due to the severity of nerve compression preoperatively.  For now, patient would like to hold off on proceeding with left carpal tunnel release.  He is currently scheduled for 06/19/2025.  I will call and cancel his  surgery for now.  Follow up in 4 weeks for re-evaluation.    Judi Barajas MD  Orthopaedic Hand Surgery

## 2025-06-21 DIAGNOSIS — I10 PRIMARY HYPERTENSION: ICD-10-CM

## 2025-06-21 NOTE — TELEPHONE ENCOUNTER
No care due was identified.  [facility] Embedded Care Due Messages. Reference number: [id number].   6/21/2025 4:01:40 AM CDT

## 2025-06-23 DIAGNOSIS — I10 PRIMARY HYPERTENSION: ICD-10-CM

## 2025-06-23 RX ORDER — LISINOPRIL AND HYDROCHLOROTHIAZIDE 12.5; 2 MG/1; MG/1
1 TABLET ORAL DAILY
Qty: 90 TABLET | Refills: 3 | Status: SHIPPED | OUTPATIENT
Start: 2025-06-23 | End: 2026-06-23

## 2025-06-23 RX ORDER — LISINOPRIL AND HYDROCHLOROTHIAZIDE 12.5; 2 MG/1; MG/1
1 TABLET ORAL
Qty: 90 TABLET | Refills: 0 | OUTPATIENT
Start: 2025-06-23

## 2025-06-23 NOTE — TELEPHONE ENCOUNTER
No care due was identified.  [facility] Embedded Care Due Messages. Reference number: [id number].   6/23/2025 8:31:04 AM CDT

## 2025-07-07 DIAGNOSIS — M54.17 LUMBOSACRAL RADICULOPATHY: ICD-10-CM

## 2025-07-07 RX ORDER — MELOXICAM 15 MG/1
15 TABLET ORAL DAILY
Qty: 90 TABLET | Refills: 0 | Status: SHIPPED | OUTPATIENT
Start: 2025-07-07 | End: 2025-10-05

## 2025-07-07 RX ORDER — DULOXETIN HYDROCHLORIDE 30 MG/1
CAPSULE, DELAYED RELEASE ORAL
Qty: 90 CAPSULE | Refills: 0 | Status: SHIPPED | OUTPATIENT
Start: 2025-07-07

## 2025-07-07 NOTE — TELEPHONE ENCOUNTER
No care due was identified.  [facility] Embedded Care Due Messages. Reference number: [id number].   7/07/2025 4:03:10 AM CDT

## 2025-07-10 ENCOUNTER — OFFICE VISIT (OUTPATIENT)
Dept: ORTHOPEDICS | Facility: CLINIC | Age: 80
End: 2025-07-10
Payer: COMMERCIAL

## 2025-07-10 VITALS — WEIGHT: 209.44 LBS | HEIGHT: 72 IN | BODY MASS INDEX: 28.37 KG/M2

## 2025-07-10 DIAGNOSIS — Z98.890 POSTOPERATIVE STATE: Primary | ICD-10-CM

## 2025-07-10 PROCEDURE — 99999 PR PBB SHADOW E&M-EST. PATIENT-LVL III: CPT | Mod: PBBFAC,,, | Performed by: STUDENT IN AN ORGANIZED HEALTH CARE EDUCATION/TRAINING PROGRAM

## 2025-07-10 NOTE — PROGRESS NOTES
Hand Surgery Clinic Postop Note    Surgery Date: 5/29/25  Surgery: Right carpal tunnel release    Postoperative Course:  80-year-old male presents for follow up. He is 6 weeks status post the above procedure. He is still having some numbness in the thumb, index, and middle fingers.  He does feel that the numbness in the thumb maybe starting to improve some. Pain level is a 0/10. The numbness no longer keeps him from sleeping at night.  No fevers or chills. No other issues.     Vital Signs  There were no vitals filed for this visit.    Physical Exam  General - NAD  Resp - nonlabored breathing  CV - RR by RP    Right Upper Extremity:  Incision is nicely healed.  No swelling.  No erythema.  No drainage.  No ecchymosis.  Patient is able to make a fist and extend all his fingers.  Full active wrist flexion and extension.  Fires APB.  Two-point discrimination: >10/>10/6/5/5.   Sensation is intact in the distribution of the palmar cutaneous branch. Fingers are warm with brisk capillary refill.  Palpable radial pulse.  With his eyes closed, patient can tell which finger I am touching for all 5 fingers.    Imaging  No new imaging obtained today.    Assessment and Plan  80-year-old male who is 6 weeks status post right carpal tunnel release.    His pain is resolved but he still has residual numbness in the median nerve distribution.  His incision is nicely healed.    He has returned to full activities without restriction.  I discussed that he had very severe carpal tunnel previously and it will take up to a year to see the full extent of improvement in the numbness following surgery.  Patient will likely have some numbness long term from permanent nerve damage due to the severity of nerve compression prior to his carpal tunnel release surgery.  He is not having any issues with the left hand at this time. He will follow up in clinic as needed if symptoms recur or do not resolve and he would like to proceed with carpal tunnel  release of the left side.       Judi Barajas MD  Orthopaedic Hand Surgery

## 2025-08-29 RX ORDER — CYANOCOBALAMIN 1000 UG/ML
INJECTION, SOLUTION INTRAMUSCULAR; SUBCUTANEOUS
Qty: 3 ML | Refills: 1 | Status: SHIPPED | OUTPATIENT
Start: 2025-08-29

## (undated) DEVICE — COVER LIGHT HANDLE 80/CA

## (undated) DEVICE — SYR 10CC LUER LOCK

## (undated) DEVICE — GOWN FAB REINF SET-IN SLV LG

## (undated) DEVICE — ALCOHOL 70% ANTISEPTIC ISO 4OZ

## (undated) DEVICE — UNDERGLOVES BIOGEL PI SZ 7 LF

## (undated) DEVICE — SOL 9P NACL IRR PIC IL

## (undated) DEVICE — KIT TURNOVER

## (undated) DEVICE — BANDAGE ESMARK ELASTIC ST 4X9

## (undated) DEVICE — APPLICATOR CHLORAPREP ORN 26ML

## (undated) DEVICE — COVER CAMERA OPERATING ROOM

## (undated) DEVICE — DRAPE U SPLIT SHEET 54X76IN

## (undated) DEVICE — DRAPE THREE-QTR REINF 53X77IN

## (undated) DEVICE — PACK BASIC SETUP SC BR

## (undated) DEVICE — BNDG COFLEX FOAM LF2 ST 3X5YD

## (undated) DEVICE — DRESSING N ADH OIL EMUL 3X3

## (undated) DEVICE — NDL SAFETY 22G X 1.5 ECLIPSE

## (undated) DEVICE — SUPPORT ULNA NERVE PROTECTOR

## (undated) DEVICE — DRAPE STERI-DRAPE 1000 17X11IN

## (undated) DEVICE — CORD CAUTERY BIPOLAR STERILE

## (undated) DEVICE — DRAPE HAND STERILE

## (undated) DEVICE — TOWEL OR DISP STRL BLUE 4/PK

## (undated) DEVICE — POSITIONER HEAD DONUT 9IN FOAM

## (undated) DEVICE — SUT 4-0 ETHILON 18 PS-2

## (undated) DEVICE — GLOVE SURGEONS ULTRA TOUCH 6.5